# Patient Record
Sex: MALE | Race: WHITE | Employment: OTHER | URBAN - METROPOLITAN AREA
[De-identification: names, ages, dates, MRNs, and addresses within clinical notes are randomized per-mention and may not be internally consistent; named-entity substitution may affect disease eponyms.]

---

## 2017-02-10 ENCOUNTER — OFFICE VISIT (OUTPATIENT)
Dept: INTERNAL MEDICINE | Age: 82
End: 2017-02-10

## 2017-02-10 VITALS
WEIGHT: 125 LBS | DIASTOLIC BLOOD PRESSURE: 80 MMHG | SYSTOLIC BLOOD PRESSURE: 135 MMHG | HEIGHT: 73 IN | BODY MASS INDEX: 16.57 KG/M2

## 2017-02-10 DIAGNOSIS — R53.1 WEAKNESS GENERALIZED: ICD-10-CM
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
## 2017-02-10 DIAGNOSIS — S46.101A INJURY OF TENDON OF LONG HEAD OF RIGHT BICEPS, INITIAL ENCOUNTER: ICD-10-CM

## 2017-02-10 DIAGNOSIS — R07.81 RIB PAIN ON RIGHT SIDE: ICD-10-CM
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
## 2017-02-10 DIAGNOSIS — R42 DIZZINESS: ICD-10-CM

## 2017-02-10 DIAGNOSIS — R42 DIZZINESS: Primary | ICD-10-CM

## 2017-02-10 LAB
A/G RATIO: 1.4 (ref 1.1–2.2)
ALBUMIN SERPL-MCNC: 4 G/DL (ref 3.4–5)
ALP BLD-CCNC: 88 U/L (ref 40–129)
ALT SERPL-CCNC: 17 U/L (ref 10–40)
ANION GAP SERPL CALCULATED.3IONS-SCNC: 14 MMOL/L (ref 3–16)
AST SERPL-CCNC: 25 U/L (ref 15–37)
BASOPHILS ABSOLUTE: 0 K/UL (ref 0–0.2)
BASOPHILS RELATIVE PERCENT: 0.7 %
BILIRUB SERPL-MCNC: 0.5 MG/DL (ref 0–1)
BUN BLDV-MCNC: 14 MG/DL (ref 7–20)
CALCIUM SERPL-MCNC: 9.2 MG/DL (ref 8.3–10.6)
CHLORIDE BLD-SCNC: 93 MMOL/L (ref 99–110)
CO2: 28 MMOL/L (ref 21–32)
CREAT SERPL-MCNC: <0.5 MG/DL (ref 0.8–1.3)
EOSINOPHILS ABSOLUTE: 0.2 K/UL (ref 0–0.6)
EOSINOPHILS RELATIVE PERCENT: 2.8 %
GFR AFRICAN AMERICAN: >60
GFR NON-AFRICAN AMERICAN: >60
GLOBULIN: 2.8 G/DL
GLUCOSE BLD-MCNC: 71 MG/DL (ref 70–99)
HCT VFR BLD CALC: 42.6 % (ref 40.5–52.5)
HEMOGLOBIN: 14.1 G/DL (ref 13.5–17.5)
LYMPHOCYTES ABSOLUTE: 1.1 K/UL (ref 1–5.1)
LYMPHOCYTES RELATIVE PERCENT: 18.4 %
MCH RBC QN AUTO: 30 PG (ref 26–34)
MCHC RBC AUTO-ENTMCNC: 33.1 G/DL (ref 31–36)
MCV RBC AUTO: 90.5 FL (ref 80–100)
MONOCYTES ABSOLUTE: 0.8 K/UL (ref 0–1.3)
MONOCYTES RELATIVE PERCENT: 14 %
NEUTROPHILS ABSOLUTE: 3.8 K/UL (ref 1.7–7.7)
NEUTROPHILS RELATIVE PERCENT: 64.1 %
PDW BLD-RTO: 13 % (ref 12.4–15.4)
PLATELET # BLD: 227 K/UL (ref 135–450)
PMV BLD AUTO: 8.4 FL (ref 5–10.5)
POTASSIUM SERPL-SCNC: 4.6 MMOL/L (ref 3.5–5.1)
RBC # BLD: 4.7 M/UL (ref 4.2–5.9)
SODIUM BLD-SCNC: 135 MMOL/L (ref 136–145)
TOTAL PROTEIN: 6.8 G/DL (ref 6.4–8.2)
WBC # BLD: 5.9 K/UL (ref 4–11)

## 2017-02-10 PROCEDURE — 99214 OFFICE O/P EST MOD 30 MIN: CPT | Performed by: INTERNAL MEDICINE

## 2017-02-10 PROCEDURE — G8428 CUR MEDS NOT DOCUMENT: HCPCS | Performed by: INTERNAL MEDICINE

## 2017-02-10 PROCEDURE — 1036F TOBACCO NON-USER: CPT | Performed by: INTERNAL MEDICINE

## 2017-02-10 PROCEDURE — 93000 ELECTROCARDIOGRAM COMPLETE: CPT | Performed by: INTERNAL MEDICINE

## 2017-02-10 PROCEDURE — 1123F ACP DISCUSS/DSCN MKR DOCD: CPT | Performed by: INTERNAL MEDICINE

## 2017-02-10 PROCEDURE — G8484 FLU IMMUNIZE NO ADMIN: HCPCS | Performed by: INTERNAL MEDICINE

## 2017-02-10 PROCEDURE — 4040F PNEUMOC VAC/ADMIN/RCVD: CPT | Performed by: INTERNAL MEDICINE

## 2017-02-10 PROCEDURE — G8419 CALC BMI OUT NRM PARAM NOF/U: HCPCS | Performed by: INTERNAL MEDICINE

## 2017-02-11 ASSESSMENT — ENCOUNTER SYMPTOMS
VOMITING: 0
SHORTNESS OF BREATH: 0
ABDOMINAL PAIN: 0
SINUS PRESSURE: 0
CHEST TIGHTNESS: 0

## 2017-04-05 ENCOUNTER — OFFICE VISIT (OUTPATIENT)
Dept: INTERNAL MEDICINE | Age: 82
End: 2017-04-05

## 2017-04-05 VITALS — BODY MASS INDEX: 16.04 KG/M2 | WEIGHT: 121 LBS | HEIGHT: 73 IN

## 2017-04-05 DIAGNOSIS — H61.22 EXCESSIVE EAR WAX, LEFT: Primary | ICD-10-CM

## 2017-04-05 PROCEDURE — 1036F TOBACCO NON-USER: CPT | Performed by: INTERNAL MEDICINE

## 2017-04-05 PROCEDURE — G8428 CUR MEDS NOT DOCUMENT: HCPCS | Performed by: INTERNAL MEDICINE

## 2017-04-05 PROCEDURE — 1123F ACP DISCUSS/DSCN MKR DOCD: CPT | Performed by: INTERNAL MEDICINE

## 2017-04-05 PROCEDURE — G8419 CALC BMI OUT NRM PARAM NOF/U: HCPCS | Performed by: INTERNAL MEDICINE

## 2017-04-05 PROCEDURE — 99213 OFFICE O/P EST LOW 20 MIN: CPT | Performed by: INTERNAL MEDICINE

## 2017-04-05 PROCEDURE — 4040F PNEUMOC VAC/ADMIN/RCVD: CPT | Performed by: INTERNAL MEDICINE

## 2017-04-05 ASSESSMENT — ENCOUNTER SYMPTOMS
ABDOMINAL PAIN: 0
SINUS PRESSURE: 0
VOMITING: 0
SHORTNESS OF BREATH: 0
CHEST TIGHTNESS: 0

## 2017-11-14 PROBLEM — E86.0 DEHYDRATION: Status: ACTIVE | Noted: 2017-11-14

## 2017-11-14 PROBLEM — J06.9 UPPER RESPIRATORY TRACT INFECTION: Status: ACTIVE | Noted: 2017-11-14

## 2017-11-15 PROBLEM — J18.9 SEPSIS DUE TO PNEUMONIA (HCC): Status: ACTIVE | Noted: 2017-11-15

## 2017-11-15 PROBLEM — A41.9 SEPSIS (HCC): Status: ACTIVE | Noted: 2017-11-15

## 2017-11-15 PROBLEM — A41.9 SEPSIS DUE TO PNEUMONIA (HCC): Status: ACTIVE | Noted: 2017-11-15

## 2017-11-27 ENCOUNTER — OFFICE VISIT (OUTPATIENT)
Dept: INTERNAL MEDICINE | Age: 82
End: 2017-11-27

## 2017-11-27 VITALS
HEIGHT: 74 IN | WEIGHT: 118 LBS | BODY MASS INDEX: 15.14 KG/M2 | DIASTOLIC BLOOD PRESSURE: 90 MMHG | SYSTOLIC BLOOD PRESSURE: 142 MMHG

## 2017-11-27 DIAGNOSIS — R53.1 WEAKNESS GENERALIZED: ICD-10-CM

## 2017-11-27 DIAGNOSIS — J06.9 UPPER RESPIRATORY TRACT INFECTION, UNSPECIFIED TYPE: ICD-10-CM

## 2017-11-27 DIAGNOSIS — J18.9 PNEUMONIA OF RIGHT LOWER LOBE DUE TO INFECTIOUS ORGANISM: ICD-10-CM

## 2017-11-27 DIAGNOSIS — A41.9 SEPSIS, DUE TO UNSPECIFIED ORGANISM: ICD-10-CM

## 2017-11-27 DIAGNOSIS — R42 DIZZINESS: Primary | ICD-10-CM

## 2017-11-27 PROCEDURE — 1036F TOBACCO NON-USER: CPT | Performed by: INTERNAL MEDICINE

## 2017-11-27 PROCEDURE — 1123F ACP DISCUSS/DSCN MKR DOCD: CPT | Performed by: INTERNAL MEDICINE

## 2017-11-27 PROCEDURE — 99214 OFFICE O/P EST MOD 30 MIN: CPT | Performed by: INTERNAL MEDICINE

## 2017-11-27 PROCEDURE — G8427 DOCREV CUR MEDS BY ELIG CLIN: HCPCS | Performed by: INTERNAL MEDICINE

## 2017-11-27 PROCEDURE — G8419 CALC BMI OUT NRM PARAM NOF/U: HCPCS | Performed by: INTERNAL MEDICINE

## 2017-11-27 PROCEDURE — G8484 FLU IMMUNIZE NO ADMIN: HCPCS | Performed by: INTERNAL MEDICINE

## 2017-11-27 PROCEDURE — 1111F DSCHRG MED/CURRENT MED MERGE: CPT | Performed by: INTERNAL MEDICINE

## 2017-11-27 PROCEDURE — 4040F PNEUMOC VAC/ADMIN/RCVD: CPT | Performed by: INTERNAL MEDICINE

## 2017-11-27 ASSESSMENT — PATIENT HEALTH QUESTIONNAIRE - PHQ9
SUM OF ALL RESPONSES TO PHQ9 QUESTIONS 1 & 2: 0
2. FEELING DOWN, DEPRESSED OR HOPELESS: 0
SUM OF ALL RESPONSES TO PHQ QUESTIONS 1-9: 0
1. LITTLE INTEREST OR PLEASURE IN DOING THINGS: 0

## 2017-11-27 NOTE — PROGRESS NOTES
Outpatient Follow Up Note    Subjective:   CHIEF COMPLAINT / HPI:       Mark Burton  presents today for follow up from hospital after being admitted and treated for an acute respiratory illness of pneumonia. He was admitted on 11/15/17 with generalized weakness,volume depletion, respiratory infection resulting in pneumonia. He developed a picture of sepsis and was treated aggressively with antibiotics. He did respond and was able to be discharged home and now following up at this office. He feels much improved. He is off antibiotics       Past Medical History:    No past medical history on file. Social History:    History   Smoking Status    Never Smoker   Smokeless Tobacco    Never Used         No family history on file. Current Medications:  Current Outpatient Prescriptions on File Prior to Visit   Medication Sig Dispense Refill    tamsulosin (FLOMAX) 0.4 MG capsule Take 1 capsule by mouth daily 30 capsule 3    cefUROXime (CEFTIN) 500 MG tablet Take 1 tablet by mouth 2 times daily for 10 days 20 tablet 0    Multiple Vitamin (MULTIVITAMIN PO) Take  by mouth. No current facility-administered medications on file prior to visit. No Known Allergies    REVIEW OF SYSTEMS:      CONSTITUTIONAL: No major weight  Loss but has fatigue, weakness but resolution of his fever  HEENT: No new vision difficulties or ringing in the ears. No congestion or sore throat. RESPIRATORY: No new SOB, PND, orthopnea or cough. CARDIOVASCULAR: No chest pain or palpitations. GI: No nausea, vomiting, diarrhea, constipation, abdominal pain or changes in bowel habits. : Urinary frequency and urgency No incontinence hematuria or dysuria. SKIN: No cyanosis or skin lesions. MUSCULOSKELETAL: New muscle and joint pain. NEUROLOGICAL: No syncope or TIA-like symptoms. No change in sensation or strength.   PSYCHIATRIC: No anxiety,or depression    Objective:   PHYSICAL EXAM:        VITALS:  BP (!) 142/90   Ht 6' 1.5\" (1.867

## 2018-01-19 ENCOUNTER — PATIENT MESSAGE (OUTPATIENT)
Dept: INTERNAL MEDICINE | Age: 83
End: 2018-01-19

## 2018-02-01 ENCOUNTER — HOSPITAL ENCOUNTER (OUTPATIENT)
Dept: OTHER | Age: 83
Discharge: HOME | End: 2018-02-28
Attending: HOSPITALIST | Admitting: HOSPITALIST

## 2018-03-01 ENCOUNTER — HOSPITAL ENCOUNTER (OUTPATIENT)
Dept: OTHER | Age: 83
Discharge: HOME | End: 2018-03-31
Attending: HOSPITALIST | Admitting: HOSPITALIST

## 2018-04-01 ENCOUNTER — HOSPITAL ENCOUNTER (OUTPATIENT)
Dept: OTHER | Age: 83
Discharge: HOME | End: 2018-04-30
Attending: HOSPITALIST | Admitting: HOSPITALIST

## 2018-04-12 PROBLEM — E86.0 DEHYDRATION: Status: RESOLVED | Noted: 2017-11-14 | Resolved: 2018-04-12

## 2018-05-01 ENCOUNTER — HOSPITAL ENCOUNTER (OUTPATIENT)
Dept: OTHER | Age: 83
Discharge: HOME | End: 2018-05-31
Attending: HOSPITALIST | Admitting: HOSPITALIST

## 2018-06-01 ENCOUNTER — HOSPITAL ENCOUNTER (OUTPATIENT)
Dept: OTHER | Age: 83
Discharge: HOME | End: 2018-06-30
Attending: HOSPITALIST | Admitting: HOSPITALIST

## 2018-07-01 ENCOUNTER — HOSPITAL ENCOUNTER (OUTPATIENT)
Dept: OTHER | Age: 83
Discharge: HOME | End: 2018-07-01
Attending: HOSPITALIST | Admitting: HOSPITALIST

## 2019-03-22 ENCOUNTER — OFFICE VISIT (OUTPATIENT)
Dept: INTERNAL MEDICINE CLINIC | Age: 84
End: 2019-03-22
Payer: MEDICARE

## 2019-03-22 VITALS
DIASTOLIC BLOOD PRESSURE: 82 MMHG | BODY MASS INDEX: 16.55 KG/M2 | SYSTOLIC BLOOD PRESSURE: 130 MMHG | WEIGHT: 129 LBS | HEIGHT: 74 IN

## 2019-03-22 DIAGNOSIS — N40.1 BENIGN PROSTATIC HYPERPLASIA WITH URINARY HESITANCY: ICD-10-CM

## 2019-03-22 DIAGNOSIS — Z23 NEED FOR PNEUMOCOCCAL VACCINE: ICD-10-CM

## 2019-03-22 DIAGNOSIS — R39.11 BENIGN PROSTATIC HYPERPLASIA WITH URINARY HESITANCY: ICD-10-CM

## 2019-03-22 DIAGNOSIS — R63.6 UNDERWEIGHT: ICD-10-CM

## 2019-03-22 DIAGNOSIS — Z00.00 WELL ADULT EXAM: Primary | ICD-10-CM

## 2019-03-22 PROCEDURE — G0009 ADMIN PNEUMOCOCCAL VACCINE: HCPCS | Performed by: HOSPITALIST

## 2019-03-22 PROCEDURE — 99214 OFFICE O/P EST MOD 30 MIN: CPT | Performed by: HOSPITALIST

## 2019-03-22 PROCEDURE — G8484 FLU IMMUNIZE NO ADMIN: HCPCS | Performed by: HOSPITALIST

## 2019-03-22 PROCEDURE — 1101F PT FALLS ASSESS-DOCD LE1/YR: CPT | Performed by: HOSPITALIST

## 2019-03-22 PROCEDURE — G8419 CALC BMI OUT NRM PARAM NOF/U: HCPCS | Performed by: HOSPITALIST

## 2019-03-22 PROCEDURE — G8427 DOCREV CUR MEDS BY ELIG CLIN: HCPCS | Performed by: HOSPITALIST

## 2019-03-22 PROCEDURE — 1036F TOBACCO NON-USER: CPT | Performed by: HOSPITALIST

## 2019-03-22 PROCEDURE — 90732 PPSV23 VACC 2 YRS+ SUBQ/IM: CPT | Performed by: HOSPITALIST

## 2019-03-22 PROCEDURE — 4040F PNEUMOC VAC/ADMIN/RCVD: CPT | Performed by: HOSPITALIST

## 2019-03-22 PROCEDURE — 1123F ACP DISCUSS/DSCN MKR DOCD: CPT | Performed by: HOSPITALIST

## 2019-03-22 ASSESSMENT — PATIENT HEALTH QUESTIONNAIRE - PHQ9
2. FEELING DOWN, DEPRESSED OR HOPELESS: 0
SUM OF ALL RESPONSES TO PHQ9 QUESTIONS 1 & 2: 0
SUM OF ALL RESPONSES TO PHQ QUESTIONS 1-9: 0
SUM OF ALL RESPONSES TO PHQ QUESTIONS 1-9: 0
1. LITTLE INTEREST OR PLEASURE IN DOING THINGS: 0

## 2019-03-22 ASSESSMENT — ENCOUNTER SYMPTOMS
WHEEZING: 0
CONSTIPATION: 0
RECTAL PAIN: 0
SHORTNESS OF BREATH: 0
ABDOMINAL PAIN: 0
CHEST TIGHTNESS: 0
BLOOD IN STOOL: 0
NAUSEA: 0
VOMITING: 0

## 2019-03-23 ENCOUNTER — PATIENT MESSAGE (OUTPATIENT)
Dept: INTERNAL MEDICINE CLINIC | Age: 84
End: 2019-03-23

## 2019-03-25 DIAGNOSIS — Z00.00 WELL ADULT EXAM: ICD-10-CM

## 2019-03-25 LAB
A/G RATIO: 1.3 (ref 1.1–2.2)
ALBUMIN SERPL-MCNC: 3.9 G/DL (ref 3.4–5)
ALP BLD-CCNC: 75 U/L (ref 40–129)
ALT SERPL-CCNC: 18 U/L (ref 10–40)
ANION GAP SERPL CALCULATED.3IONS-SCNC: 11 MMOL/L (ref 3–16)
AST SERPL-CCNC: 29 U/L (ref 15–37)
BASOPHILS ABSOLUTE: 0.1 K/UL (ref 0–0.2)
BASOPHILS RELATIVE PERCENT: 1.3 %
BILIRUB SERPL-MCNC: 0.7 MG/DL (ref 0–1)
BUN BLDV-MCNC: 15 MG/DL (ref 7–20)
CALCIUM SERPL-MCNC: 9.2 MG/DL (ref 8.3–10.6)
CHLORIDE BLD-SCNC: 102 MMOL/L (ref 99–110)
CHOLESTEROL, TOTAL: 167 MG/DL (ref 0–199)
CO2: 28 MMOL/L (ref 21–32)
CREAT SERPL-MCNC: 0.6 MG/DL (ref 0.8–1.3)
EOSINOPHILS ABSOLUTE: 0.2 K/UL (ref 0–0.6)
EOSINOPHILS RELATIVE PERCENT: 3.4 %
GFR AFRICAN AMERICAN: >60
GFR NON-AFRICAN AMERICAN: >60
GLOBULIN: 2.9 G/DL
GLUCOSE BLD-MCNC: 91 MG/DL (ref 70–99)
HCT VFR BLD CALC: 43.4 % (ref 40.5–52.5)
HDLC SERPL-MCNC: 52 MG/DL (ref 40–60)
HEMOGLOBIN: 14.3 G/DL (ref 13.5–17.5)
LDL CHOLESTEROL CALCULATED: 103 MG/DL
LYMPHOCYTES ABSOLUTE: 1.3 K/UL (ref 1–5.1)
LYMPHOCYTES RELATIVE PERCENT: 22.1 %
MCH RBC QN AUTO: 29.9 PG (ref 26–34)
MCHC RBC AUTO-ENTMCNC: 32.9 G/DL (ref 31–36)
MCV RBC AUTO: 90.9 FL (ref 80–100)
MONOCYTES ABSOLUTE: 0.8 K/UL (ref 0–1.3)
MONOCYTES RELATIVE PERCENT: 13.2 %
NEUTROPHILS ABSOLUTE: 3.4 K/UL (ref 1.7–7.7)
NEUTROPHILS RELATIVE PERCENT: 60 %
PDW BLD-RTO: 13.5 % (ref 12.4–15.4)
PLATELET # BLD: 231 K/UL (ref 135–450)
PMV BLD AUTO: 9 FL (ref 5–10.5)
POTASSIUM SERPL-SCNC: 4.6 MMOL/L (ref 3.5–5.1)
RBC # BLD: 4.78 M/UL (ref 4.2–5.9)
SODIUM BLD-SCNC: 141 MMOL/L (ref 136–145)
TOTAL PROTEIN: 6.8 G/DL (ref 6.4–8.2)
TRIGL SERPL-MCNC: 58 MG/DL (ref 0–150)
TSH SERPL DL<=0.05 MIU/L-ACNC: 3 UIU/ML (ref 0.27–4.2)
VLDLC SERPL CALC-MCNC: 12 MG/DL
WBC # BLD: 5.7 K/UL (ref 4–11)

## 2019-04-01 ENCOUNTER — TELEPHONE (OUTPATIENT)
Dept: INTERNAL MEDICINE CLINIC | Age: 84
End: 2019-04-01

## 2019-04-02 ENCOUNTER — OFFICE VISIT (OUTPATIENT)
Dept: INTERNAL MEDICINE CLINIC | Age: 84
End: 2019-04-02
Payer: MEDICARE

## 2019-04-02 VITALS
TEMPERATURE: 98.4 F | DIASTOLIC BLOOD PRESSURE: 66 MMHG | WEIGHT: 129 LBS | BODY MASS INDEX: 16.55 KG/M2 | HEIGHT: 74 IN | SYSTOLIC BLOOD PRESSURE: 110 MMHG

## 2019-04-02 DIAGNOSIS — I49.9 IRREGULAR HEART RATE: ICD-10-CM

## 2019-04-02 DIAGNOSIS — J06.9 ACUTE URI: Primary | ICD-10-CM

## 2019-04-02 PROCEDURE — G8427 DOCREV CUR MEDS BY ELIG CLIN: HCPCS | Performed by: HOSPITALIST

## 2019-04-02 PROCEDURE — 93000 ELECTROCARDIOGRAM COMPLETE: CPT | Performed by: HOSPITALIST

## 2019-04-02 PROCEDURE — 1123F ACP DISCUSS/DSCN MKR DOCD: CPT | Performed by: HOSPITALIST

## 2019-04-02 PROCEDURE — 4040F PNEUMOC VAC/ADMIN/RCVD: CPT | Performed by: HOSPITALIST

## 2019-04-02 PROCEDURE — G8419 CALC BMI OUT NRM PARAM NOF/U: HCPCS | Performed by: HOSPITALIST

## 2019-04-02 PROCEDURE — 1036F TOBACCO NON-USER: CPT | Performed by: HOSPITALIST

## 2019-04-02 PROCEDURE — 99213 OFFICE O/P EST LOW 20 MIN: CPT | Performed by: HOSPITALIST

## 2019-04-02 ASSESSMENT — PATIENT HEALTH QUESTIONNAIRE - PHQ9
2. FEELING DOWN, DEPRESSED OR HOPELESS: 0
SUM OF ALL RESPONSES TO PHQ9 QUESTIONS 1 & 2: 0
1. LITTLE INTEREST OR PLEASURE IN DOING THINGS: 0
SUM OF ALL RESPONSES TO PHQ QUESTIONS 1-9: 0
SUM OF ALL RESPONSES TO PHQ QUESTIONS 1-9: 0

## 2019-04-02 NOTE — PROGRESS NOTES
Follow Up Visit Established Patient Visit    Patient:  Joe Hook                                               : 1934  Age: 80 y.o. MRN: [id number]  Date : 2019    Mark Sanderson is a 80 y.o. male who presents for :  Evaluation of sinus congestion, post-nansal drip, and sneezing in the last 3-4 days. He also reports mild cough. Started to take Mucinex twice a day couple of days ago. He doesn't report fever nor chills. No facial pain. Chief Complaint   Patient presents with    Cough     and sneezing       No past medical history on file. History reviewed. No pertinent surgical history. Current Outpatient Medications   Medication Sig Dispense Refill    tamsulosin (FLOMAX) 0.4 MG capsule Take 1 capsule by mouth daily 30 capsule 3    Multiple Vitamin (MULTIVITAMIN PO) Take  by mouth. No current facility-administered medications for this visit. /66   Temp 98.4 °F (36.9 °C)   Ht 6' 1.5\" (1.867 m)   Wt 129 lb (58.5 kg)   BMI 16.79 kg/m²     Physical Exam   Constitutional: He is oriented to person, place, and time. Thin looking  gentleman In no acute distress. Cooperative with examination. HENT:   Head: Normocephalic and atraumatic. Right Ear: External ear normal.   Left Ear: External ear normal.   Mouth/Throat: Oropharynx is clear and moist.   Nasal mucosa is moderately swollen and mildly inflamed. + small amount of greenish discharge in the R inferior turbinate.  + L sided septal deviation. Eyes: Pupils are equal, round, and reactive to light. Conjunctivae and EOM are normal. No scleral icterus. Neck: Normal range of motion. Neck supple. No JVD present. No thyromegaly present. Cardiovascular: Normal heart sounds and intact distal pulses. Exam reveals no gallop. Irregular heart rate   Pulmonary/Chest: Effort normal and breath sounds normal. He has no wheezes. He has no rales. Abdominal: Soft.  Bowel sounds are normal. He exhibits no distension and no mass. There is no tenderness. There is no guarding. Lymphadenopathy:     He has no cervical adenopathy. Neurological: He is alert and oriented to person, place, and time. No cranial nerve deficit. Skin: Skin is warm. No rash noted. No erythema. Psychiatric: He has a normal mood and affect. Vitals reviewed. 12 lead ECG- sinus rhythm; PVCs, no acute ischemic changes  Assessment/ plan:     Mark was seen today for cough.     Diagnoses and all orders for this visit:    Acute URI       -     Appears to be viral in nature; will proceed with supportive care       -     Recommended to use Flonase 2 sprays in each nostril daily as needed; stay well hydrated; Mucinex 600 mg PO BID prn for cough    Irregular heart rate  -     EKG 12 lead  -     Will monitor      Follow up as needed        Nirali Ware MD

## 2019-07-10 PROCEDURE — 9900000038 HC CARDIAC REHAB PHASE 3 - MONTHLY

## 2019-07-29 ENCOUNTER — HOSPITAL ENCOUNTER (OUTPATIENT)
Dept: CARDIAC REHAB | Age: 84
Discharge: HOME | End: 2019-07-29
Payer: MEDICARE

## 2019-08-09 PROCEDURE — 9900000038 HC CARDIAC REHAB PHASE 3 - MONTHLY

## 2019-08-26 ENCOUNTER — HOSPITAL ENCOUNTER (OUTPATIENT)
Dept: CARDIAC REHAB | Age: 84
Discharge: HOME | End: 2019-08-26
Payer: MEDICARE

## 2019-09-09 PROCEDURE — 9900000038 HC CARDIAC REHAB PHASE 3 - MONTHLY

## 2019-09-25 ENCOUNTER — OFFICE VISIT (OUTPATIENT)
Dept: INTERNAL MEDICINE CLINIC | Age: 84
End: 2019-09-25
Payer: MEDICARE

## 2019-09-25 VITALS — BODY MASS INDEX: 16.01 KG/M2 | WEIGHT: 123 LBS | SYSTOLIC BLOOD PRESSURE: 142 MMHG | DIASTOLIC BLOOD PRESSURE: 78 MMHG

## 2019-09-25 DIAGNOSIS — R39.11 BENIGN PROSTATIC HYPERPLASIA WITH URINARY HESITANCY: Primary | ICD-10-CM

## 2019-09-25 DIAGNOSIS — N40.1 BENIGN PROSTATIC HYPERPLASIA WITH URINARY HESITANCY: Primary | ICD-10-CM

## 2019-09-25 DIAGNOSIS — Z13.220 SCREENING CHOLESTEROL LEVEL: ICD-10-CM

## 2019-09-25 DIAGNOSIS — R03.0 ELEVATED BP WITHOUT DIAGNOSIS OF HYPERTENSION: ICD-10-CM

## 2019-09-25 DIAGNOSIS — Z23 NEED FOR INFLUENZA VACCINATION: ICD-10-CM

## 2019-09-25 PROCEDURE — 1123F ACP DISCUSS/DSCN MKR DOCD: CPT | Performed by: HOSPITALIST

## 2019-09-25 PROCEDURE — G8419 CALC BMI OUT NRM PARAM NOF/U: HCPCS | Performed by: HOSPITALIST

## 2019-09-25 PROCEDURE — 1036F TOBACCO NON-USER: CPT | Performed by: HOSPITALIST

## 2019-09-25 PROCEDURE — 99214 OFFICE O/P EST MOD 30 MIN: CPT | Performed by: HOSPITALIST

## 2019-09-25 PROCEDURE — 4040F PNEUMOC VAC/ADMIN/RCVD: CPT | Performed by: HOSPITALIST

## 2019-09-25 PROCEDURE — G0008 ADMIN INFLUENZA VIRUS VAC: HCPCS | Performed by: HOSPITALIST

## 2019-09-25 PROCEDURE — 90653 IIV ADJUVANT VACCINE IM: CPT | Performed by: HOSPITALIST

## 2019-09-25 PROCEDURE — G8427 DOCREV CUR MEDS BY ELIG CLIN: HCPCS | Performed by: HOSPITALIST

## 2019-09-25 ASSESSMENT — PATIENT HEALTH QUESTIONNAIRE - PHQ9
1. LITTLE INTEREST OR PLEASURE IN DOING THINGS: 0
SUM OF ALL RESPONSES TO PHQ9 QUESTIONS 1 & 2: 0
2. FEELING DOWN, DEPRESSED OR HOPELESS: 0
SUM OF ALL RESPONSES TO PHQ QUESTIONS 1-9: 0
SUM OF ALL RESPONSES TO PHQ QUESTIONS 1-9: 0

## 2019-09-25 NOTE — PROGRESS NOTES
Vaccine Information Sheet, \"Influenza - Inactivated\"  given to Mark Burton, or parent/legal guardian of  Mark Burton and verbalized understanding. Patient responses:    Have you ever had a reaction to a flu vaccine? No  Do you have any current illness? No  Have you ever had Guillian Jessie Syndrome? No  Do you have a serious allergy to any of the follow: Neomycin, Polymyxin, Thimerosal, eggs or egg products? No    Flu vaccine given per order. Please see immunization tab. Risks and benefits explained. Current VIS given.       Immunizations Administered     Name Date Dose Route    Influenza, Triv, inactivated, subunit, adjuvanted, IM (Fluad 65 yrs and older) 9/25/2019 0.5 mL Intramuscular    Site: Deltoid- Left    Lot: [id number]    [address]: 95363-406-26

## 2019-09-25 NOTE — PROGRESS NOTES
Follow Up Visit Established Patient Visit    Patient:  Kadeem Augustine                                               : 1934  Age: 80 y.o. MRN: [id number]  Date : 2019    Mark Gil is a 80 y.o. male who presents for :  Follow up appointment. Chief Complaint   Patient presents with    6 Month Follow-Up     Doesn't report any urinary hesitancy/ urgency. Takes Flomax regularly. No recent falls; doesn't feel depressed. No obvious hearing loss. Exercises twice/ week for 1 hour at cardiac rehab program of [facility] [employer]  No CP/SOB; no nausea/vomiting/ diarrhea. Past Medical History:   Diagnosis Date    Bilateral pneumonia 2017    BPH (benign prostatic hyperplasia)        No past surgical history on file. Current Outpatient Medications   Medication Sig Dispense Refill    tamsulosin (FLOMAX) 0.4 MG capsule Take 1 capsule by mouth daily 30 capsule 3    Multiple Vitamin (MULTIVITAMIN PO) Take  by mouth. No current facility-administered medications for this visit. BP (!) 142/78   Wt 123 lb (55.8 kg)   BMI 16.01 kg/m²      Physical Exam   Constitutional: He is oriented to person, place, and time. Thin looking  male. Pleasant and cooperative with examination. In no acute distress. HENT:   Head: Normocephalic and atraumatic. Right Ear: External ear normal.   Left Ear: External ear normal.   Mouth/Throat: Oropharynx is clear and moist. No oropharyngeal exudate. Nasal mucosa is mildly swollen and inflamed   Eyes: Pupils are equal, round, and reactive to light. Conjunctivae and EOM are normal. No scleral icterus. Neck: Normal range of motion. Neck supple. No JVD present. No thyromegaly present. Cardiovascular: Normal rate, regular rhythm, normal heart sounds and intact distal pulses. No murmur heard. Pulmonary/Chest: Effort normal and breath sounds normal. He has no wheezes. He has no rales. He exhibits no tenderness. Abdominal: Soft.  Bowel sounds are

## 2019-09-30 ENCOUNTER — HOSPITAL ENCOUNTER (OUTPATIENT)
Dept: CARDIAC REHAB | Age: 84
Discharge: HOME | End: 2019-09-30
Payer: MEDICARE

## 2019-10-08 DIAGNOSIS — R03.0 ELEVATED BP WITHOUT DIAGNOSIS OF HYPERTENSION: ICD-10-CM

## 2019-10-08 DIAGNOSIS — Z13.220 SCREENING CHOLESTEROL LEVEL: ICD-10-CM

## 2019-10-08 LAB
A/G RATIO: 2.3 (ref 1.1–2.2)
ALBUMIN SERPL-MCNC: 4.6 G/DL (ref 3.4–5)
ALP BLD-CCNC: 72 U/L (ref 40–129)
ALT SERPL-CCNC: 15 U/L (ref 10–40)
ANION GAP SERPL CALCULATED.3IONS-SCNC: 12 MMOL/L (ref 3–16)
AST SERPL-CCNC: 27 U/L (ref 15–37)
BASOPHILS ABSOLUTE: 0.1 K/UL (ref 0–0.2)
BASOPHILS RELATIVE PERCENT: 1.1 %
BILIRUB SERPL-MCNC: 0.7 MG/DL (ref 0–1)
BUN BLDV-MCNC: 15 MG/DL (ref 7–20)
CALCIUM SERPL-MCNC: 9.3 MG/DL (ref 8.3–10.6)
CHLORIDE BLD-SCNC: 99 MMOL/L (ref 99–110)
CHOLESTEROL, TOTAL: 147 MG/DL (ref 0–199)
CO2: 28 MMOL/L (ref 21–32)
CREAT SERPL-MCNC: 0.6 MG/DL (ref 0.8–1.3)
EOSINOPHILS ABSOLUTE: 0.1 K/UL (ref 0–0.6)
EOSINOPHILS RELATIVE PERCENT: 2.8 %
GFR AFRICAN AMERICAN: >60
GFR NON-AFRICAN AMERICAN: >60
GLOBULIN: 2 G/DL
GLUCOSE BLD-MCNC: 88 MG/DL (ref 70–99)
HCT VFR BLD CALC: 39.7 % (ref 40.5–52.5)
HDLC SERPL-MCNC: 48 MG/DL (ref 40–60)
HEMOGLOBIN: 13.4 G/DL (ref 13.5–17.5)
LDL CHOLESTEROL CALCULATED: 87 MG/DL
LYMPHOCYTES ABSOLUTE: 1.2 K/UL (ref 1–5.1)
LYMPHOCYTES RELATIVE PERCENT: 23.8 %
MCH RBC QN AUTO: 30.3 PG (ref 26–34)
MCHC RBC AUTO-ENTMCNC: 33.7 G/DL (ref 31–36)
MCV RBC AUTO: 90.1 FL (ref 80–100)
MONOCYTES ABSOLUTE: 0.7 K/UL (ref 0–1.3)
MONOCYTES RELATIVE PERCENT: 14.6 %
NEUTROPHILS ABSOLUTE: 2.8 K/UL (ref 1.7–7.7)
NEUTROPHILS RELATIVE PERCENT: 57.7 %
PDW BLD-RTO: 13.4 % (ref 12.4–15.4)
PLATELET # BLD: 207 K/UL (ref 135–450)
PMV BLD AUTO: 8.3 FL (ref 5–10.5)
POTASSIUM SERPL-SCNC: 4.3 MMOL/L (ref 3.5–5.1)
RBC # BLD: 4.4 M/UL (ref 4.2–5.9)
SODIUM BLD-SCNC: 139 MMOL/L (ref 136–145)
TOTAL PROTEIN: 6.6 G/DL (ref 6.4–8.2)
TRIGL SERPL-MCNC: 61 MG/DL (ref 0–150)
VLDLC SERPL CALC-MCNC: 12 MG/DL
WBC # BLD: 4.9 K/UL (ref 4–11)

## 2019-10-11 PROCEDURE — 9900000038 HC CARDIAC REHAB PHASE 3 - MONTHLY

## 2019-10-28 ENCOUNTER — HOSPITAL ENCOUNTER (OUTPATIENT)
Dept: CARDIAC REHAB | Age: 84
Discharge: HOME | End: 2019-10-28
Payer: MEDICARE

## 2019-11-11 PROCEDURE — 9900000038 HC CARDIAC REHAB PHASE 3 - MONTHLY

## 2019-11-25 ENCOUNTER — HOSPITAL ENCOUNTER (OUTPATIENT)
Dept: CARDIAC REHAB | Age: 84
Discharge: HOME | End: 2019-11-25
Payer: MEDICARE

## 2019-12-09 PROCEDURE — 9900000038 HC CARDIAC REHAB PHASE 3 - MONTHLY

## 2019-12-11 ENCOUNTER — OFFICE VISIT (OUTPATIENT)
Dept: INTERNAL MEDICINE CLINIC | Age: 84
End: 2019-12-11
Payer: MEDICARE

## 2019-12-11 VITALS
DIASTOLIC BLOOD PRESSURE: 80 MMHG | BODY MASS INDEX: 16.04 KG/M2 | HEIGHT: 74 IN | WEIGHT: 125 LBS | SYSTOLIC BLOOD PRESSURE: 122 MMHG

## 2019-12-11 DIAGNOSIS — N40.1 BENIGN PROSTATIC HYPERPLASIA WITH URINARY HESITANCY: ICD-10-CM

## 2019-12-11 DIAGNOSIS — R03.0 ELEVATED BP WITHOUT DIAGNOSIS OF HYPERTENSION: ICD-10-CM

## 2019-12-11 DIAGNOSIS — Z00.00 ROUTINE GENERAL MEDICAL EXAMINATION AT A HEALTH CARE FACILITY: Primary | ICD-10-CM

## 2019-12-11 DIAGNOSIS — Z23 NEED FOR SHINGLES VACCINE: ICD-10-CM

## 2019-12-11 DIAGNOSIS — R39.11 BENIGN PROSTATIC HYPERPLASIA WITH URINARY HESITANCY: ICD-10-CM

## 2019-12-11 PROCEDURE — G0439 PPPS, SUBSEQ VISIT: HCPCS | Performed by: HOSPITALIST

## 2019-12-11 PROCEDURE — 4040F PNEUMOC VAC/ADMIN/RCVD: CPT | Performed by: HOSPITALIST

## 2019-12-11 PROCEDURE — G8482 FLU IMMUNIZE ORDER/ADMIN: HCPCS | Performed by: HOSPITALIST

## 2019-12-11 PROCEDURE — 1123F ACP DISCUSS/DSCN MKR DOCD: CPT | Performed by: HOSPITALIST

## 2019-12-11 ASSESSMENT — LIFESTYLE VARIABLES
HOW OFTEN DO YOU HAVE A DRINK CONTAINING ALCOHOL: 2
HOW MANY STANDARD DRINKS CONTAINING ALCOHOL DO YOU HAVE ON A TYPICAL DAY: 0
HOW OFTEN DURING THE LAST YEAR HAVE YOU FAILED TO DO WHAT WAS NORMALLY EXPECTED FROM YOU BECAUSE OF DRINKING: 0
HOW OFTEN DURING THE LAST YEAR HAVE YOU BEEN UNABLE TO REMEMBER WHAT HAPPENED THE NIGHT BEFORE BECAUSE YOU HAD BEEN DRINKING: 0
HAVE YOU OR SOMEONE ELSE BEEN INJURED AS A RESULT OF YOUR DRINKING: 0
AUDIT TOTAL SCORE: 2
HOW OFTEN DURING THE LAST YEAR HAVE YOU NEEDED AN ALCOHOLIC DRINK FIRST THING IN THE MORNING TO GET YOURSELF GOING AFTER A NIGHT OF HEAVY DRINKING: 0
HOW OFTEN DURING THE LAST YEAR HAVE YOU HAD A FEELING OF GUILT OR REMORSE AFTER DRINKING: 0
AUDIT-C TOTAL SCORE: 2
HOW OFTEN DO YOU HAVE SIX OR MORE DRINKS ON ONE OCCASION: 0
HOW OFTEN DURING THE LAST YEAR HAVE YOU FOUND THAT YOU WERE NOT ABLE TO STOP DRINKING ONCE YOU HAD STARTED: 0
HAS A RELATIVE, FRIEND, DOCTOR, OR ANOTHER HEALTH PROFESSIONAL EXPRESSED CONCERN ABOUT YOUR DRINKING OR SUGGESTED YOU CUT DOWN: 0

## 2019-12-11 ASSESSMENT — PATIENT HEALTH QUESTIONNAIRE - PHQ9
SUM OF ALL RESPONSES TO PHQ QUESTIONS 1-9: 0
SUM OF ALL RESPONSES TO PHQ QUESTIONS 1-9: 0

## 2019-12-30 ENCOUNTER — HOSPITAL ENCOUNTER (OUTPATIENT)
Dept: CARDIAC REHAB | Age: 84
Discharge: HOME | End: 2019-12-30
Payer: MEDICARE

## 2020-01-03 ENCOUNTER — TELEPHONE (OUTPATIENT)
Dept: INTERNAL MEDICINE CLINIC | Age: 85
End: 2020-01-03

## 2020-01-03 RX ORDER — LISINOPRIL 5 MG/1
5 TABLET ORAL DAILY
Qty: 30 TABLET | Refills: 3 | Status: SHIPPED | OUTPATIENT
Start: 2020-01-03 | End: 2020-05-05 | Stop reason: SDUPTHER

## 2020-02-03 ENCOUNTER — OFFICE VISIT (OUTPATIENT)
Dept: INTERNAL MEDICINE CLINIC | Age: 85
End: 2020-02-03
Payer: MEDICARE

## 2020-02-03 VITALS — WEIGHT: 125 LBS | BODY MASS INDEX: 16.27 KG/M2 | SYSTOLIC BLOOD PRESSURE: 124 MMHG | DIASTOLIC BLOOD PRESSURE: 84 MMHG

## 2020-02-03 PROCEDURE — 4040F PNEUMOC VAC/ADMIN/RCVD: CPT | Performed by: HOSPITALIST

## 2020-02-03 PROCEDURE — 1123F ACP DISCUSS/DSCN MKR DOCD: CPT | Performed by: HOSPITALIST

## 2020-02-03 PROCEDURE — G8419 CALC BMI OUT NRM PARAM NOF/U: HCPCS | Performed by: HOSPITALIST

## 2020-02-03 PROCEDURE — G8482 FLU IMMUNIZE ORDER/ADMIN: HCPCS | Performed by: HOSPITALIST

## 2020-02-03 PROCEDURE — 1036F TOBACCO NON-USER: CPT | Performed by: HOSPITALIST

## 2020-02-03 PROCEDURE — 99213 OFFICE O/P EST LOW 20 MIN: CPT | Performed by: HOSPITALIST

## 2020-02-03 PROCEDURE — G8510 SCR DEP NEG, NO PLAN REQD: HCPCS | Performed by: HOSPITALIST

## 2020-02-03 PROCEDURE — G8427 DOCREV CUR MEDS BY ELIG CLIN: HCPCS | Performed by: HOSPITALIST

## 2020-02-03 ASSESSMENT — PATIENT HEALTH QUESTIONNAIRE - PHQ9
SUM OF ALL RESPONSES TO PHQ9 QUESTIONS 1 & 2: 0
SUM OF ALL RESPONSES TO PHQ QUESTIONS 1-9: 0
SUM OF ALL RESPONSES TO PHQ QUESTIONS 1-9: 0
2. FEELING DOWN, DEPRESSED OR HOPELESS: 0
1. LITTLE INTEREST OR PLEASURE IN DOING THINGS: 0

## 2020-02-03 NOTE — PROGRESS NOTES
Normal breath sounds. No wheezing or rales. Abdominal:      General: Bowel sounds are normal. There is no distension. Palpations: Abdomen is soft. Tenderness: There is no abdominal tenderness. Musculoskeletal: Normal range of motion. Right lower leg: No edema. Left lower leg: No edema. Skin:     General: Skin is warm and dry. Neurological:      General: No focal deficit present. Mental Status: He is alert and oriented to person, place, and time. Cranial Nerves: No cranial nerve deficit. Psychiatric:         Mood and Affect: Mood normal.         Assessment/ plan:     Mark was seen today for hypertension. Diagnoses and all orders for this visit:    Essential hypertension  -Patient brought in log for BP. Well managed on lisnopril 5mg.  -Continue current dose. -Explained the need to call if orthostatic dizziness, low BP readings or any fluctuations in BP    Benign prostatic hyperplasia with urinary hesitancy  -On flomax. Stable. Improved sym toms. No new changes. Continue current treatment    Preventative care  -Discussed shingles vaccine. Patient will decide at a late time. Return in about 6 months (around 8/3/2020) for HTN, BPH.     Nakita Boateng MD

## 2020-02-25 ENCOUNTER — HOSPITAL ENCOUNTER (OUTPATIENT)
Dept: CARDIAC REHAB | Age: 85
Discharge: HOME | End: 2020-02-25
Payer: MEDICARE

## 2020-02-25 PROCEDURE — 9900000038 HC CARDIAC REHAB PHASE 3 - MONTHLY: Performed by: PHYSICAL THERAPIST

## 2020-05-05 RX ORDER — LISINOPRIL 5 MG/1
5 TABLET ORAL DAILY
Qty: 30 TABLET | Refills: 3 | Status: SHIPPED | OUTPATIENT
Start: 2020-05-05 | End: 2020-08-01

## 2020-08-01 RX ORDER — LISINOPRIL 5 MG/1
TABLET ORAL
Qty: 90 TABLET | Refills: 1 | Status: SHIPPED | OUTPATIENT
Start: 2020-08-01 | End: 2021-02-03

## 2020-08-06 ENCOUNTER — OFFICE VISIT (OUTPATIENT)
Dept: INTERNAL MEDICINE CLINIC | Age: 85
End: 2020-08-06
Payer: MEDICARE

## 2020-08-06 VITALS
HEIGHT: 74 IN | WEIGHT: 112 LBS | SYSTOLIC BLOOD PRESSURE: 128 MMHG | DIASTOLIC BLOOD PRESSURE: 87 MMHG | TEMPERATURE: 97.9 F | BODY MASS INDEX: 14.37 KG/M2

## 2020-08-06 PROCEDURE — 99213 OFFICE O/P EST LOW 20 MIN: CPT | Performed by: HOSPITALIST

## 2020-08-06 PROCEDURE — 1123F ACP DISCUSS/DSCN MKR DOCD: CPT | Performed by: HOSPITALIST

## 2020-08-06 PROCEDURE — 1036F TOBACCO NON-USER: CPT | Performed by: HOSPITALIST

## 2020-08-06 PROCEDURE — 4040F PNEUMOC VAC/ADMIN/RCVD: CPT | Performed by: HOSPITALIST

## 2020-08-06 PROCEDURE — G8427 DOCREV CUR MEDS BY ELIG CLIN: HCPCS | Performed by: HOSPITALIST

## 2020-08-06 PROCEDURE — G8419 CALC BMI OUT NRM PARAM NOF/U: HCPCS | Performed by: HOSPITALIST

## 2020-08-06 NOTE — PROGRESS NOTES
Follow Up Visit Established Patient Visit    Patient:  Cam Dominguez                                               : 1934  Age: 80 y.o. MRN: <[id number]>  Date : 2020    Mark Lui is a 80 y.o. male who presents for :  Follow up    Hypertension - Checks BP at home, runs in the 130s at home. Denies CP, SOB, palpitations, lightheadedness, LE swelling. Denies abdominal pain, N/V.    BPH - denies urinary frequency, urgency, hesitation, weak stream. Reports he has some nocturia but well controlled with flomax    Chief Complaint   Patient presents with    Blood Pressure Check       Past Medical History:   Diagnosis Date    Bilateral pneumonia 2017    BPH (benign prostatic hyperplasia)        No past surgical history on file. Current Outpatient Medications   Medication Sig Dispense Refill    lisinopril (PRINIVIL;ZESTRIL) 5 MG tablet TAKE 1 TABLET BY MOUTH EVERY DAY 90 tablet 1    tamsulosin (FLOMAX) 0.4 MG capsule Take 1 capsule by mouth daily 30 capsule 3    Multiple Vitamin (MULTIVITAMIN PO) Take  by mouth. No current facility-administered medications for this visit. /87   Temp 97.9 °F (36.6 °C)   Ht 6' 1.5\" (1.867 m)   Wt 112 lb (50.8 kg)   BMI 14.58 kg/m²     Physical Exam   Physical Exam  Vitals signs reviewed. Constitutional:       General: He is not in acute distress. Appearance: Normal appearance. He is not ill-appearing. HENT:      Head: Normocephalic and atraumatic. Nose: Nose normal.      Mouth/Throat:      Mouth: Mucous membranes are moist.      Pharynx: Oropharynx is clear. No oropharyngeal exudate or posterior oropharyngeal erythema. Eyes:      Extraocular Movements: Extraocular movements intact. Conjunctiva/sclera: Conjunctivae normal.      Pupils: Pupils are equal, round, and reactive to light. Neck:      Musculoskeletal: Normal range of motion and neck supple. No neck rigidity or muscular tenderness. Vascular: No carotid bruit. Cardiovascular:      Rate and Rhythm: Normal rate and regular rhythm. Pulses: Normal pulses. Heart sounds: Normal heart sounds. No murmur. No friction rub. No gallop. Pulmonary:      Effort: Pulmonary effort is normal. No respiratory distress. Breath sounds: Normal breath sounds. No wheezing, rhonchi or rales. Abdominal:      General: Abdomen is flat. Bowel sounds are normal. There is no distension. Palpations: There is no mass. Tenderness: There is no abdominal tenderness. There is no right CVA tenderness, left CVA tenderness or guarding. Hernia: No hernia is present. Musculoskeletal: Normal range of motion. Right lower leg: No edema. Left lower leg: No edema. Lymphadenopathy:      Cervical: No cervical adenopathy. Skin:     General: Skin is warm. Findings: No erythema or rash. Neurological:      General: No focal deficit present. Mental Status: He is alert and oriented to person, place, and time.    Psychiatric:         Mood and Affect: Mood normal.         Assessment/ plan:     Hypertension  Well controlled  Continue lisinopril at current dose  Will repeat labs - CBC, CMP, lipids    BPH  Stable symptoms  Continue flomax    Follow-up in 6 months    Monica Robles MD

## 2020-08-24 DIAGNOSIS — I10 ESSENTIAL HYPERTENSION: ICD-10-CM

## 2020-08-24 LAB
A/G RATIO: 1.7 (ref 1.1–2.2)
ALBUMIN SERPL-MCNC: 3.9 G/DL (ref 3.4–5)
ALP BLD-CCNC: 64 U/L (ref 40–129)
ALT SERPL-CCNC: 16 U/L (ref 10–40)
ANION GAP SERPL CALCULATED.3IONS-SCNC: 8 MMOL/L (ref 3–16)
AST SERPL-CCNC: 23 U/L (ref 15–37)
BASOPHILS ABSOLUTE: 0 K/UL (ref 0–0.2)
BASOPHILS RELATIVE PERCENT: 0.5 %
BILIRUB SERPL-MCNC: 0.6 MG/DL (ref 0–1)
BUN BLDV-MCNC: 17 MG/DL (ref 7–20)
CALCIUM SERPL-MCNC: 9.2 MG/DL (ref 8.3–10.6)
CHLORIDE BLD-SCNC: 100 MMOL/L (ref 99–110)
CHOLESTEROL, TOTAL: 139 MG/DL (ref 0–199)
CO2: 29 MMOL/L (ref 21–32)
CREAT SERPL-MCNC: 0.6 MG/DL (ref 0.8–1.3)
EOSINOPHILS ABSOLUTE: 0.1 K/UL (ref 0–0.6)
EOSINOPHILS RELATIVE PERCENT: 2.5 %
GFR AFRICAN AMERICAN: >60
GFR NON-AFRICAN AMERICAN: >60
GLOBULIN: 2.3 G/DL
GLUCOSE BLD-MCNC: 77 MG/DL (ref 70–99)
HCT VFR BLD CALC: 37.7 % (ref 40.5–52.5)
HDLC SERPL-MCNC: 50 MG/DL (ref 40–60)
HEMOGLOBIN: 12.8 G/DL (ref 13.5–17.5)
LDL CHOLESTEROL CALCULATED: 80 MG/DL
LYMPHOCYTES ABSOLUTE: 1.6 K/UL (ref 1–5.1)
LYMPHOCYTES RELATIVE PERCENT: 28.3 %
MCH RBC QN AUTO: 30.8 PG (ref 26–34)
MCHC RBC AUTO-ENTMCNC: 34.1 G/DL (ref 31–36)
MCV RBC AUTO: 90.2 FL (ref 80–100)
MONOCYTES ABSOLUTE: 0.8 K/UL (ref 0–1.3)
MONOCYTES RELATIVE PERCENT: 14.2 %
NEUTROPHILS ABSOLUTE: 3 K/UL (ref 1.7–7.7)
NEUTROPHILS RELATIVE PERCENT: 54.5 %
PDW BLD-RTO: 13.2 % (ref 12.4–15.4)
PLATELET # BLD: 204 K/UL (ref 135–450)
PMV BLD AUTO: 8 FL (ref 5–10.5)
POTASSIUM SERPL-SCNC: 4.1 MMOL/L (ref 3.5–5.1)
RBC # BLD: 4.17 M/UL (ref 4.2–5.9)
SODIUM BLD-SCNC: 137 MMOL/L (ref 136–145)
TOTAL PROTEIN: 6.2 G/DL (ref 6.4–8.2)
TRIGL SERPL-MCNC: 43 MG/DL (ref 0–150)
VLDLC SERPL CALC-MCNC: 9 MG/DL
WBC # BLD: 5.6 K/UL (ref 4–11)

## 2020-09-01 RX ORDER — TAMSULOSIN HYDROCHLORIDE 0.4 MG/1
0.4 CAPSULE ORAL DAILY
Qty: 30 CAPSULE | Refills: 3 | Status: SHIPPED | OUTPATIENT
Start: 2020-09-01 | End: 2021-02-02

## 2020-09-01 NOTE — TELEPHONE ENCOUNTER
med refill    tamsulosin (FLOMAX) 0.4 MG capsule     [facility]/pharmacy #[id number]- [place], [place] - [address] - P [phone] - F [phone]

## 2021-02-02 RX ORDER — TAMSULOSIN HYDROCHLORIDE 0.4 MG/1
CAPSULE ORAL
Qty: 90 CAPSULE | Refills: 1 | Status: SHIPPED | OUTPATIENT
Start: 2021-02-02 | End: 2021-08-06

## 2021-02-03 RX ORDER — LISINOPRIL 5 MG/1
TABLET ORAL
Qty: 90 TABLET | Refills: 1 | Status: SHIPPED | OUTPATIENT
Start: 2021-02-03 | End: 2021-07-30

## 2021-02-04 ENCOUNTER — IMMUNIZATION (OUTPATIENT)
Dept: PRIMARY CARE CLINIC | Age: 86
End: 2021-02-04
Payer: MEDICARE

## 2021-02-04 PROCEDURE — 0011A COVID-19, MODERNA VACCINE 100MCG/0.5ML DOSE: CPT | Performed by: FAMILY MEDICINE

## 2021-02-04 PROCEDURE — 91301 COVID-19, MODERNA VACCINE 100MCG/0.5ML DOSE: CPT | Performed by: FAMILY MEDICINE

## 2021-03-04 ENCOUNTER — IMMUNIZATION (OUTPATIENT)
Dept: PRIMARY CARE CLINIC | Age: 86
End: 2021-03-04
Payer: MEDICARE

## 2021-03-04 PROCEDURE — 0012A COVID-19, MODERNA VACCINE 100MCG/0.5ML DOSE: CPT | Performed by: FAMILY MEDICINE

## 2021-03-04 PROCEDURE — 91301 COVID-19, MODERNA VACCINE 100MCG/0.5ML DOSE: CPT | Performed by: FAMILY MEDICINE

## 2021-07-30 RX ORDER — LISINOPRIL 5 MG/1
TABLET ORAL
Qty: 90 TABLET | Refills: 0 | Status: SHIPPED | OUTPATIENT
Start: 2021-07-30 | End: 2021-10-26

## 2021-08-07 RX ORDER — TAMSULOSIN HYDROCHLORIDE 0.4 MG/1
CAPSULE ORAL
Qty: 30 CAPSULE | Refills: 0 | Status: SHIPPED | OUTPATIENT
Start: 2021-08-07 | End: 2021-09-07

## 2021-08-20 ENCOUNTER — TELEPHONE (OUTPATIENT)
Dept: INTERNAL MEDICINE CLINIC | Age: 86
End: 2021-08-20

## 2021-08-20 NOTE — TELEPHONE ENCOUNTER
----- Message from [facility] sent at 8/20/2021  1:38 PM EDT -----  Subject: Message to Provider    QUESTIONS  Information for Provider? Mark's son will be in town 8/25-8/30 and he will   be taking his parents to their appointments. He would like to schedule an   additional appointment in addition to the ones they have scheduled on 8/30   so that he can get his parents immunizations, labs, and vaccinations up to   date. There is only one available appointment on 8/30 and i can not   schedule both of them.  ---------------------------------------------------------------------------  --------------  CALL BACK INFO  What is the best way for the office to contact you? OK to leave message on   yeppt, OK to respond with electronic message via [employer] portal (only   for patients who have registered [employer] account)  Preferred Call Back Phone Number? [phone]  ---------------------------------------------------------------------------  --------------  SCRIPT ANSWERS  Relationship to Patient?  Self

## 2021-08-25 ENCOUNTER — TELEPHONE (OUTPATIENT)
Dept: INTERNAL MEDICINE CLINIC | Age: 86
End: 2021-08-25

## 2021-08-25 ASSESSMENT — PATIENT HEALTH QUESTIONNAIRE - PHQ9
2. FEELING DOWN, DEPRESSED OR HOPELESS: 0
SUM OF ALL RESPONSES TO PHQ QUESTIONS 1-9: 0
SUM OF ALL RESPONSES TO PHQ QUESTIONS 1-9: 0
SUM OF ALL RESPONSES TO PHQ9 QUESTIONS 1 & 2: 0
1. LITTLE INTEREST OR PLEASURE IN DOING THINGS: 0
SUM OF ALL RESPONSES TO PHQ QUESTIONS 1-9: 0

## 2021-08-25 ASSESSMENT — LIFESTYLE VARIABLES
HOW OFTEN DURING THE LAST YEAR HAVE YOU FOUND THAT YOU WERE NOT ABLE TO STOP DRINKING ONCE YOU HAD STARTED: NEVER
HOW OFTEN DURING THE LAST YEAR HAVE YOU NEEDED AN ALCOHOLIC DRINK FIRST THING IN THE MORNING TO GET YOURSELF GOING AFTER A NIGHT OF HEAVY DRINKING: NEVER
HOW OFTEN DURING THE LAST YEAR HAVE YOU NEEDED AN ALCOHOLIC DRINK FIRST THING IN THE MORNING TO GET YOURSELF GOING AFTER A NIGHT OF HEAVY DRINKING: 0
HOW OFTEN DURING THE LAST YEAR HAVE YOU FOUND THAT YOU WERE NOT ABLE TO STOP DRINKING ONCE YOU HAD STARTED: 0
HOW OFTEN DURING THE LAST YEAR HAVE YOU FAILED TO DO WHAT WAS NORMALLY EXPECTED FROM YOU BECAUSE OF DRINKING: NEVER
HOW OFTEN DO YOU HAVE SIX OR MORE DRINKS ON ONE OCCASION: NEVER
HOW OFTEN DURING THE LAST YEAR HAVE YOU FAILED TO DO WHAT WAS NORMALLY EXPECTED FROM YOU BECAUSE OF DRINKING: 0
HOW MANY STANDARD DRINKS CONTAINING ALCOHOL DO YOU HAVE ON A TYPICAL DAY: 0
HOW MANY STANDARD DRINKS CONTAINING ALCOHOL DO YOU HAVE ON A TYPICAL DAY: ONE OR TWO
HAVE YOU OR SOMEONE ELSE BEEN INJURED AS A RESULT OF YOUR DRINKING: 0
HOW OFTEN DURING THE LAST YEAR HAVE YOU BEEN UNABLE TO REMEMBER WHAT HAPPENED THE NIGHT BEFORE BECAUSE YOU HAD BEEN DRINKING: NEVER
HAVE YOU OR SOMEONE ELSE BEEN INJURED AS A RESULT OF YOUR DRINKING: NO
AUDIT-C TOTAL SCORE: 0
HOW OFTEN DO YOU HAVE A DRINK CONTAINING ALCOHOL: 2
HOW OFTEN DURING THE LAST YEAR HAVE YOU HAD A FEELING OF GUILT OR REMORSE AFTER DRINKING: NEVER
HOW OFTEN DURING THE LAST YEAR HAVE YOU HAD A FEELING OF GUILT OR REMORSE AFTER DRINKING: 0
AUDIT TOTAL SCORE: 0
HOW OFTEN DURING THE LAST YEAR HAVE YOU BEEN UNABLE TO REMEMBER WHAT HAPPENED THE NIGHT BEFORE BECAUSE YOU HAD BEEN DRINKING: 0
HOW OFTEN DO YOU HAVE A DRINK CONTAINING ALCOHOL: TWO TO FOUR TIMES A MONTH
HAS A RELATIVE, FRIEND, DOCTOR, OR ANOTHER HEALTH PROFESSIONAL EXPRESSED CONCERN ABOUT YOUR DRINKING OR SUGGESTED YOU CUT DOWN: NO
HOW OFTEN DO YOU HAVE SIX OR MORE DRINKS ON ONE OCCASION: 0
HAS A RELATIVE, FRIEND, DOCTOR, OR ANOTHER HEALTH PROFESSIONAL EXPRESSED CONCERN ABOUT YOUR DRINKING OR SUGGESTED YOU CUT DOWN: 0
AUDIT TOTAL SCORE: 2
AUDIT-C TOTAL SCORE: 2

## 2021-08-25 NOTE — TELEPHONE ENCOUNTER
----- Message from Taya Nick sent at 8/25/2021  9:15 AM EDT -----  Subject: Referral Request    QUESTIONS   Reason for referral request? Patient is requesting Shingles vaccine,   Potassium monitoring, and Creatine monitoring. Has the physician seen you for this condition before? No   Preferred Specialist (if applicable)? Do you already have an appointment scheduled? Yes  Select Scheduled Date? 2021-08-30  Select Scheduled Physician? Ana Rosa Emery   Additional Information for Provider?   ---------------------------------------------------------------------------  --------------  Harinder LINTON  What is the best way for the office to contact you? OK to leave message on   voicemail  Preferred Call Back Phone Number?  [phone]

## 2021-08-30 ENCOUNTER — OFFICE VISIT (OUTPATIENT)
Dept: INTERNAL MEDICINE CLINIC | Age: 86
End: 2021-08-30
Payer: MEDICARE

## 2021-08-30 VITALS
SYSTOLIC BLOOD PRESSURE: 126 MMHG | HEIGHT: 74 IN | BODY MASS INDEX: 15.02 KG/M2 | WEIGHT: 117 LBS | DIASTOLIC BLOOD PRESSURE: 82 MMHG

## 2021-08-30 DIAGNOSIS — Z23 NEED FOR INFLUENZA VACCINATION: ICD-10-CM

## 2021-08-30 DIAGNOSIS — H61.23 BILATERAL IMPACTED CERUMEN: ICD-10-CM

## 2021-08-30 DIAGNOSIS — R68.89 FORGETFULNESS: ICD-10-CM

## 2021-08-30 DIAGNOSIS — N40.0 BENIGN PROSTATIC HYPERPLASIA WITHOUT LOWER URINARY TRACT SYMPTOMS: ICD-10-CM

## 2021-08-30 DIAGNOSIS — Z00.00 ROUTINE GENERAL MEDICAL EXAMINATION AT A HEALTH CARE FACILITY: Primary | ICD-10-CM

## 2021-08-30 DIAGNOSIS — I10 ESSENTIAL HYPERTENSION: ICD-10-CM

## 2021-08-30 PROCEDURE — G0439 PPPS, SUBSEQ VISIT: HCPCS | Performed by: HOSPITALIST

## 2021-08-30 PROCEDURE — 90694 VACC AIIV4 NO PRSRV 0.5ML IM: CPT | Performed by: HOSPITALIST

## 2021-08-30 PROCEDURE — G0008 ADMIN INFLUENZA VIRUS VAC: HCPCS | Performed by: HOSPITALIST

## 2021-08-30 PROCEDURE — 4040F PNEUMOC VAC/ADMIN/RCVD: CPT | Performed by: HOSPITALIST

## 2021-08-30 PROCEDURE — 1123F ACP DISCUSS/DSCN MKR DOCD: CPT | Performed by: HOSPITALIST

## 2021-08-30 SDOH — ECONOMIC STABILITY: FOOD INSECURITY: WITHIN THE PAST 12 MONTHS, YOU WORRIED THAT YOUR FOOD WOULD RUN OUT BEFORE YOU GOT MONEY TO BUY MORE.: NEVER TRUE

## 2021-08-30 SDOH — ECONOMIC STABILITY: FOOD INSECURITY: WITHIN THE PAST 12 MONTHS, THE FOOD YOU BOUGHT JUST DIDN'T LAST AND YOU DIDN'T HAVE MONEY TO GET MORE.: NEVER TRUE

## 2021-08-30 ASSESSMENT — SOCIAL DETERMINANTS OF HEALTH (SDOH): HOW HARD IS IT FOR YOU TO PAY FOR THE VERY BASICS LIKE FOOD, HOUSING, MEDICAL CARE, AND HEATING?: NOT HARD AT ALL

## 2021-08-30 NOTE — PROGRESS NOTES
Medicare Annual Wellness Visit  Name: Adam Townsend Date: 2021   MRN: <[id number]> Sex: Male   Age: 80 y.o. Ethnicity: Non- / Non    : 1934 Race: White (non-)      Mark Burton is here for Medicare AWV    Screenings for behavioral, psychosocial and functional/safety risks, and cognitive dysfunction are all negative except as indicated below. These results, as well as other patient data from the [address] form, are documented in Flowsheets linked to this Encounter. No Known Allergies      Prior to Visit Medications    Medication Sig Taking? Authorizing Provider   tamsulosin (FLOMAX) 0.4 MG capsule NEEDS APPOINTMENT - TAKE ONE TABLET BY MOUTH DAILY Yes Michelle Mcallister MD   lisinopril (PRINIVIL;ZESTRIL) 5 MG tablet TAKE 1 TABLET BY MOUTH EVERY DAY Yes Michelle Mcallister MD   Multiple Vitamin (MULTIVITAMIN PO) Take  by mouth. Yes Historical Provider, MD         Past Medical History:   Diagnosis Date    Bilateral pneumonia 2017    BPH (benign prostatic hyperplasia)        No past surgical history on file. Family History   Problem Relation Age of Onset    Breast Cancer Mother     Heart Failure Father        CareTeam (Including outside providers/suppliers regularly involved in providing care):   Patient Care Team:  Michelle Mcallister MD as PCP - General (Internal Medicine)  Michelle Mcallister MD as PCP - REHABILITATION [facility] Empaneled Provider    Wt Readings from Last 3 Encounters:   21 117 lb (53.1 kg)   20 112 lb (50.8 kg)   20 125 lb (56.7 kg)     Vitals:    21 0903   BP: 126/82   Weight: 117 lb (53.1 kg)   Height: 6' 1.5\" (1.867 m)     Body mass index is 15.23 kg/m². Based upon direct observation of the patient, evaluation of cognition reveals remote memory intact, recent memory impaired.     General Appearance: alert and oriented to person, place and time, well developed and well- nourished, in no acute distress  Skin: warm and dry, no rash or erythema  Head: normocephalic and atraumatic  Eyes: pupils equal, round, and reactive to light, extraocular eye movements intact, conjunctivae normal  ENT: + Bilateral external auditory canal cerumen impaction. Nose without deformity, nasal mucosa and turbinates normal without polyps  Neck: supple and non-tender without mass, no thyromegaly or thyroid nodules, no cervical lymphadenopathy  Pulmonary/Chest: clear to auscultation bilaterally- no wheezes, rales or rhonchi, normal air movement, no respiratory distress  Cardiovascular: normal rate, regular rhythm, normal S1 and S2, no murmurs, rubs, clicks, or gallops, distal pulses intact, no carotid bruits  Abdomen: soft, non-tender, non-distended, normal bowel sounds, no masses or organomegaly  Extremities: no cyanosis, clubbing or edema  Musculoskeletal: normal range of motion, no joint swelling, deformity or tenderness  Neurologic: reflexes normal and symmetric, no cranial nerve deficit, gait, coordination and speech normal    Patient's complete Health Risk Assessment and screening values have been reviewed and are found in Flowsheets. The following problems were reviewed today and where indicated follow up appointments were made and/or referrals ordered. HTN- checks his BP once/ week. It is usually well controlled. BPH -that Flomax controlled his symptoms well. Forgetfulness -per the patient's spouse and his daughter, Neha Soria, the patient appears to become more forgetful and has some short term memory deficits. Does not have previous history of dementia. Still able to perform his activities of daily living. Positive Risk Factor Screenings with Interventions:            General Health and ACP:  General  In general, how would you say your health is?: Good  In the past 7 days, have you experienced any of the following?  New or Increased Pain, New or Increased Fatigue, Loneliness, Social Isolation, Stress or Anger?: None of These  Do you get the social and emotional support Annual Wellness Visit (AWV)  Never done    Shingles Vaccine (3 of 3) 03/30/2020    Potassium monitoring  08/24/2021    Creatinine monitoring  08/24/2021    DTaP/Tdap/Td vaccine (2 - Td or Tdap) 04/07/2026    Flu vaccine  Completed    Pneumococcal 65+ years Vaccine  Completed    COVID-19 Vaccine  Completed    Hepatitis A vaccine  Aged Out    Hepatitis B vaccine  Aged Out    Hib vaccine  Aged Out    Meningococcal (ACWY) vaccine  Aged Out     Recommendations for [employer] Due: see orders and patient instructions/AVS.  . Recommended screening schedule for the next 5-10 years is provided to the patient in written form: see Patient Instructions/AVS.    Gene was seen today for medicare awv. Diagnoses and all orders for this visit:    Routine general medical examination at a health care facility      -      Encouraged the patient to increase his regular exercise activities    Essential hypertension  -     CBC Auto Differential; Future  -     Comprehensive Metabolic Panel; Future  -     Lipid Panel; Future  -     TSH without Reflex; Future  -     Continue current medications    Benign prostatic hyperplasia without lower urinary tract symptoms         -     Controlled; continue oral Flomax    Bilateral impacted cerumen          -    The patient will have a nursing visit in 2-3 weeks for bilateral external auditory canal irrigation for cerumen impaction treatment    Forgetfulness  -     TSH without Reflex; Future  -     VITAMIN B12; Future  -     RPR with FTA Relex;  Future    Need for influenza vaccination  -     INFLUENZA, QUADV, ADJUVANTED, 65 YRS =, IM, PF, PREFILL SYR, 0.5ML (FLUAD)      Follow-up in 3 to 4 weeks for evaluation of possible dementia    Dennise Serrato MD

## 2021-08-30 NOTE — PATIENT INSTRUCTIONS
Personalized Preventive Plan for Mark PEREZ [facility] - 8/30/2021  Medicare offers a range of preventive health benefits. Some of the tests and screenings are paid in full while other may be subject to a deductible, co-insurance, and/or copay. Some of these benefits include a comprehensive review of your medical history including lifestyle, illnesses that may run in your family, and various assessments and screenings as appropriate. After reviewing your medical record and screening and assessments performed today your provider may have ordered immunizations, labs, imaging, and/or referrals for you. A list of these orders (if applicable) as well as your Preventive Care list are included within your After Visit Summary for your review. Other Preventive Recommendations:    · A preventive eye exam performed by an eye specialist is recommended every 1-2 years to screen for glaucoma; cataracts, macular degeneration, and other eye disorders. · A preventive dental visit is recommended every 6 months. · Try to get at least 150 minutes of exercise per week or 10,000 steps per day on a pedometer . · Order or download the FREE \"Exercise & Physical Activity: Your Everyday Guide\" from [employer]. Call 9-405.265.9040 or search [employer] online. · You need 8364-9737 mg of calcium and 6658-7255 IU of vitamin D per day. It is possible to meet your calcium requirement with diet alone, but a vitamin D supplement is usually necessary to meet this goal.  · When exposed to the sun, use a sunscreen that protects against both UVA and UVB radiation with an SPF of 30 or greater. Reapply every 2 to 3 hours or after sweating, drying off with a towel, or swimming. · Always wear a seat belt when traveling in a car. Always wear a helmet when riding a bicycle or motorcycle.

## 2021-09-07 RX ORDER — TAMSULOSIN HYDROCHLORIDE 0.4 MG/1
CAPSULE ORAL
Qty: 30 CAPSULE | Refills: 3 | Status: SHIPPED | OUTPATIENT
Start: 2021-09-07

## 2021-09-13 ENCOUNTER — OFFICE VISIT (OUTPATIENT)
Dept: INTERNAL MEDICINE CLINIC | Age: 86
End: 2021-09-13
Payer: MEDICARE

## 2021-09-13 VITALS
BODY MASS INDEX: 14.76 KG/M2 | DIASTOLIC BLOOD PRESSURE: 70 MMHG | WEIGHT: 115 LBS | HEIGHT: 74 IN | SYSTOLIC BLOOD PRESSURE: 124 MMHG

## 2021-09-13 DIAGNOSIS — N40.0 BENIGN PROSTATIC HYPERPLASIA WITHOUT LOWER URINARY TRACT SYMPTOMS: ICD-10-CM

## 2021-09-13 DIAGNOSIS — R68.89 FORGETFULNESS: ICD-10-CM

## 2021-09-13 DIAGNOSIS — R63.6 UNDERWEIGHT: ICD-10-CM

## 2021-09-13 DIAGNOSIS — H61.23 BILATERAL IMPACTED CERUMEN: ICD-10-CM

## 2021-09-13 DIAGNOSIS — I10 ESSENTIAL HYPERTENSION: Primary | ICD-10-CM

## 2021-09-13 PROCEDURE — 4040F PNEUMOC VAC/ADMIN/RCVD: CPT | Performed by: HOSPITALIST

## 2021-09-13 PROCEDURE — 1036F TOBACCO NON-USER: CPT | Performed by: HOSPITALIST

## 2021-09-13 PROCEDURE — G8419 CALC BMI OUT NRM PARAM NOF/U: HCPCS | Performed by: HOSPITALIST

## 2021-09-13 PROCEDURE — G8427 DOCREV CUR MEDS BY ELIG CLIN: HCPCS | Performed by: HOSPITALIST

## 2021-09-13 PROCEDURE — 99214 OFFICE O/P EST MOD 30 MIN: CPT | Performed by: HOSPITALIST

## 2021-09-13 PROCEDURE — 1123F ACP DISCUSS/DSCN MKR DOCD: CPT | Performed by: HOSPITALIST

## 2021-09-13 NOTE — PROGRESS NOTES
cerumen. Mouth/Throat:      Mouth: Mucous membranes are moist.      Pharynx: Oropharynx is clear. No oropharyngeal exudate. Eyes:      General: No scleral icterus. Pupils: Pupils are equal, round, and reactive to light. Neck:      Vascular: No JVD. Cardiovascular:      Rate and Rhythm: Normal rate and regular rhythm. Heart sounds: Normal heart sounds. No murmur heard. No friction rub. No gallop. Pulmonary:      Effort: Pulmonary effort is normal. No respiratory distress. Breath sounds: Normal breath sounds. No wheezing or rales. Abdominal:      General: Bowel sounds are normal. There is no distension. Palpations: Abdomen is soft. Tenderness: There is no abdominal tenderness. There is no right CVA tenderness or left CVA tenderness. Musculoskeletal:         General: Normal range of motion. Cervical back: Normal range of motion. Right lower leg: No edema. Left lower leg: No edema. Skin:     General: Skin is warm and dry. Neurological:      General: No focal deficit present. Mental Status: He is alert and oriented to person, place, and time. Cranial Nerves: No cranial nerve deficit. Sensory: No sensory deficit. Psychiatric:         Mood and Affect: Mood normal.         Assessment/ plan:     Mark was seen today for discuss labs. Diagnoses and all orders for this visit:    Essential hypertension     -     Controlled; continue oral lisinopril    Forgetfulness      -     No signs of obvious dementia. We will try OTC Prevagen    Benign prostatic hyperplasia without lower urinary tract symptoms       -    Continue oral Flomax    Bilateral impacted cerumen  -     Mary Kay Gardner MD, Otolaryngology, [facility]         -     Encouraged to drink 1 can of Boost or Ensure twice a day with meals    Return in about 6 months (around 3/13/2022) for HTN, forgetfulness, BPH.     Ferdinand Callejas MD

## 2021-09-20 ENCOUNTER — OFFICE VISIT (OUTPATIENT)
Dept: ENT CLINIC | Age: 86
End: 2021-09-20
Payer: MEDICARE

## 2021-09-20 VITALS
BODY MASS INDEX: 15.24 KG/M2 | DIASTOLIC BLOOD PRESSURE: 85 MMHG | HEART RATE: 82 BPM | OXYGEN SATURATION: 98 % | HEIGHT: 73 IN | WEIGHT: 115 LBS | TEMPERATURE: 98.2 F | SYSTOLIC BLOOD PRESSURE: 132 MMHG

## 2021-09-20 DIAGNOSIS — H61.23 BILATERAL IMPACTED CERUMEN: ICD-10-CM

## 2021-09-20 DIAGNOSIS — H93.8X3 SENSATION OF FULLNESS IN BOTH EARS: Primary | ICD-10-CM

## 2021-09-20 PROCEDURE — 1036F TOBACCO NON-USER: CPT | Performed by: OTOLARYNGOLOGY

## 2021-09-20 PROCEDURE — 1123F ACP DISCUSS/DSCN MKR DOCD: CPT | Performed by: OTOLARYNGOLOGY

## 2021-09-20 PROCEDURE — G8419 CALC BMI OUT NRM PARAM NOF/U: HCPCS | Performed by: OTOLARYNGOLOGY

## 2021-09-20 PROCEDURE — 99203 OFFICE O/P NEW LOW 30 MIN: CPT | Performed by: OTOLARYNGOLOGY

## 2021-09-20 PROCEDURE — 69210 REMOVE IMPACTED EAR WAX UNI: CPT | Performed by: OTOLARYNGOLOGY

## 2021-09-20 PROCEDURE — 4040F PNEUMOC VAC/ADMIN/RCVD: CPT | Performed by: OTOLARYNGOLOGY

## 2021-09-20 PROCEDURE — G8427 DOCREV CUR MEDS BY ELIG CLIN: HCPCS | Performed by: OTOLARYNGOLOGY

## 2021-09-20 NOTE — PROGRESS NOTES
CHIEF COMPLAINT: In both ears. HISTORY OF PRESENT ILLNESS:  80 y.o. male who presents with fullness in both ears. The right is worse than the left. Patient denies any ear pain or otorrhea. The patient does not complain of hearing loss. PAST MEDICAL HISTORY:   Social History     Tobacco Use   Smoking Status Never Smoker   Smokeless Tobacco Never Used                                                    Social History     Substance and Sexual Activity   Alcohol Use No                                                    Current Outpatient Medications:     tamsulosin (FLOMAX) 0.4 MG capsule, NEEDS APPOINTMENT - TAKE ONE TABLET BY MOUTH DAILY, Disp: 30 capsule, Rfl: 3    lisinopril (PRINIVIL;ZESTRIL) 5 MG tablet, TAKE 1 TABLET BY MOUTH EVERY DAY, Disp: 90 tablet, Rfl: 0    Multiple Vitamin (MULTIVITAMIN PO), Take  by mouth.  , Disp: , Rfl:                                                  Past Medical History:   Diagnosis Date    Bilateral pneumonia 11/2017    BPH (benign prostatic hyperplasia)                                                   History reviewed. No pertinent surgical history. FAMILY HISTORY: Family history reviewed. Except as noted in history of present illness, there is no pertinent family history      REVIEW OF SYSTEMS:  All pertinent positive and negative review of systems included in HPI. Otherwise, all systems are reviewed and negative. PHYSICAL EXAMINATION:   GENERAL: wdwn- no acute distress  RESPIRATORY:  No stridor or respiratory distress  COMMUNICATION :  Normal voice  MENTAL STATUS:  Mood and affect normal, oriented X 3  HEAD AND FACE:  No abnormalities of the skin of face or head  EXTERNAL EARS AND NOSE:  Normal pinnae bilateral  FACIAL MUSCLES:  All branches of facial nerve intact  EXTRAOCULAR MUSCLES: Intact with full range of motion  FACE PALPATION:  No tenderness over sinuses.   Zygomatic arches and orbital rims intact  OTOSCOPY: Cerumen occludes both external auditory canals. TUNING FORKS: Rinne ++ Pham midline at 512 Hz  INTRANASAL:  Septum midline, turbinates normal, meati clear. LIPS, TEETH, GINGIVA:  Normal mucosa  PHARYNX:  Normal  NECK:  No masses. LYMPHATIC:  No cervical adenopathy  SALIVARY GLANDS:  No swelling or masses in the parotid or submandibular salivary glands  THYROID:  No goiter or thyroid masses. IMPRESSION: Impacted cerumen bilateral.    PLAN: Cerumen removed from both ears using curettes and suction's. The tympanic membranes and middle ear spaces are normal.    FOLLOW-UP: As needed.

## 2021-10-26 RX ORDER — LISINOPRIL 5 MG/1
TABLET ORAL
Qty: 90 TABLET | Refills: 0 | Status: ON HOLD | OUTPATIENT
Start: 2021-10-26 | End: 2022-01-04 | Stop reason: HOSPADM

## 2021-12-30 ENCOUNTER — APPOINTMENT (OUTPATIENT)
Dept: CT IMAGING | Age: 86
DRG: 070 | End: 2021-12-30
Payer: MEDICARE

## 2021-12-30 ENCOUNTER — APPOINTMENT (OUTPATIENT)
Dept: GENERAL RADIOLOGY | Age: 86
DRG: 070 | End: 2021-12-30
Payer: MEDICARE

## 2021-12-30 ENCOUNTER — HOSPITAL ENCOUNTER (INPATIENT)
Age: 86
LOS: 6 days | Discharge: HOSPICE | DRG: 070 | End: 2022-01-05
Attending: EMERGENCY MEDICINE | Admitting: HOSPITALIST
Payer: MEDICARE

## 2021-12-30 DIAGNOSIS — N39.0 URINARY TRACT INFECTION WITHOUT HEMATURIA, SITE UNSPECIFIED: ICD-10-CM

## 2021-12-30 DIAGNOSIS — G93.41 ACUTE METABOLIC ENCEPHALOPATHY: ICD-10-CM

## 2021-12-30 DIAGNOSIS — G93.40 ENCEPHALOPATHY: ICD-10-CM

## 2021-12-30 LAB
ALBUMIN SERPL-MCNC: 4.4 G/DL (ref 3.4–5)
ALP BLD-CCNC: 93 U/L (ref 40–129)
ALT SERPL-CCNC: 19 U/L (ref 10–40)
ANION GAP SERPL CALCULATED.3IONS-SCNC: 10 MMOL/L (ref 3–16)
AST SERPL-CCNC: 30 U/L (ref 15–37)
BACTERIA: ABNORMAL /HPF
BASE EXCESS VENOUS: 3.1 MMOL/L (ref -2–3)
BASOPHILS ABSOLUTE: 0 K/UL (ref 0–0.2)
BASOPHILS RELATIVE PERCENT: 0.4 %
BILIRUB SERPL-MCNC: 0.6 MG/DL (ref 0–1)
BILIRUBIN DIRECT: <0.2 MG/DL (ref 0–0.3)
BILIRUBIN URINE: NEGATIVE
BILIRUBIN, INDIRECT: NORMAL MG/DL (ref 0–1)
BLOOD, URINE: ABNORMAL
BUN BLDV-MCNC: 20 MG/DL (ref 7–20)
CALCIUM SERPL-MCNC: 8.8 MG/DL (ref 8.3–10.6)
CARBOXYHEMOGLOBIN: 1 % (ref 0–1.5)
CHLORIDE BLD-SCNC: 102 MMOL/L (ref 99–110)
CLARITY: CLEAR
CO2: 25 MMOL/L (ref 21–32)
COLOR: YELLOW
CREAT SERPL-MCNC: 0.6 MG/DL (ref 0.8–1.3)
EKG ATRIAL RATE: 102 BPM
EKG DIAGNOSIS: NORMAL
EKG P AXIS: 83 DEGREES
EKG P-R INTERVAL: 180 MS
EKG Q-T INTERVAL: 332 MS
EKG QRS DURATION: 78 MS
EKG QTC CALCULATION (BAZETT): 432 MS
EKG R AXIS: 76 DEGREES
EKG T AXIS: 85 DEGREES
EKG VENTRICULAR RATE: 102 BPM
EOSINOPHILS ABSOLUTE: 0 K/UL (ref 0–0.6)
EOSINOPHILS RELATIVE PERCENT: 0.5 %
EPITHELIAL CELLS, UA: ABNORMAL /HPF (ref 0–5)
GFR AFRICAN AMERICAN: >60
GFR NON-AFRICAN AMERICAN: >60
GLUCOSE BLD-MCNC: 97 MG/DL (ref 70–99)
GLUCOSE URINE: NEGATIVE MG/DL
HCO3 VENOUS: 29.4 MMOL/L (ref 24–28)
HCT VFR BLD CALC: 40.4 % (ref 40.5–52.5)
HEMOGLOBIN, VEN, REDUCED: 26.9 %
HEMOGLOBIN: 13.5 G/DL (ref 13.5–17.5)
KETONES, URINE: 15 MG/DL
LACTIC ACID, SEPSIS: 1.1 MMOL/L (ref 0.4–1.9)
LACTIC ACID, SEPSIS: 1.6 MMOL/L (ref 0.4–1.9)
LEUKOCYTE ESTERASE, URINE: NEGATIVE
LYMPHOCYTES ABSOLUTE: 0.6 K/UL (ref 1–5.1)
LYMPHOCYTES RELATIVE PERCENT: 8.1 %
MCH RBC QN AUTO: 30.6 PG (ref 26–34)
MCHC RBC AUTO-ENTMCNC: 33.4 G/DL (ref 31–36)
MCV RBC AUTO: 91.9 FL (ref 80–100)
METHEMOGLOBIN VENOUS: 0.2 % (ref 0–1.5)
MICROSCOPIC EXAMINATION: YES
MONOCYTES ABSOLUTE: 1.1 K/UL (ref 0–1.3)
MONOCYTES RELATIVE PERCENT: 14 %
MUCUS: ABNORMAL /LPF
NEUTROPHILS ABSOLUTE: 5.9 K/UL (ref 1.7–7.7)
NEUTROPHILS RELATIVE PERCENT: 77 %
NITRITE, URINE: POSITIVE
O2 SAT, VEN: 73 %
PCO2, VEN: 50.7 MMHG (ref 41–51)
PDW BLD-RTO: 13.6 % (ref 12.4–15.4)
PH UA: 6.5 (ref 5–8)
PH VENOUS: 7.37 (ref 7.35–7.45)
PLATELET # BLD: 199 K/UL (ref 135–450)
PMV BLD AUTO: 8.7 FL (ref 5–10.5)
PO2, VEN: 39.8 MMHG (ref 25–40)
POTASSIUM REFLEX MAGNESIUM: 3.9 MMOL/L (ref 3.5–5.1)
PRO-BNP: 633 PG/ML (ref 0–449)
PROTEIN UA: 100 MG/DL
RBC # BLD: 4.4 M/UL (ref 4.2–5.9)
RBC UA: ABNORMAL /HPF (ref 0–4)
SODIUM BLD-SCNC: 137 MMOL/L (ref 136–145)
SPECIFIC GRAVITY UA: >=1.03 (ref 1–1.03)
TCO2 CALC VENOUS: 31 MMOL/L
TOTAL PROTEIN: 7.2 G/DL (ref 6.4–8.2)
TROPONIN: <0.01 NG/ML
URINE TYPE: ABNORMAL
UROBILINOGEN, URINE: 0.2 E.U./DL
WBC # BLD: 7.6 K/UL (ref 4–11)
WBC UA: ABNORMAL /HPF (ref 0–5)

## 2021-12-30 PROCEDURE — 96365 THER/PROPH/DIAG IV INF INIT: CPT

## 2021-12-30 PROCEDURE — 2060000000 HC ICU INTERMEDIATE R&B

## 2021-12-30 PROCEDURE — 85025 COMPLETE CBC W/AUTO DIFF WBC: CPT

## 2021-12-30 PROCEDURE — 82803 BLOOD GASES ANY COMBINATION: CPT

## 2021-12-30 PROCEDURE — 70450 CT HEAD/BRAIN W/O DYE: CPT

## 2021-12-30 PROCEDURE — U0005 INFEC AGEN DETEC AMPLI PROBE: HCPCS

## 2021-12-30 PROCEDURE — 80076 HEPATIC FUNCTION PANEL: CPT

## 2021-12-30 PROCEDURE — 71045 X-RAY EXAM CHEST 1 VIEW: CPT

## 2021-12-30 PROCEDURE — 83605 ASSAY OF LACTIC ACID: CPT

## 2021-12-30 PROCEDURE — 6360000002 HC RX W HCPCS: Performed by: EMERGENCY MEDICINE

## 2021-12-30 PROCEDURE — 96361 HYDRATE IV INFUSION ADD-ON: CPT

## 2021-12-30 PROCEDURE — 99284 EMERGENCY DEPT VISIT MOD MDM: CPT

## 2021-12-30 PROCEDURE — 87040 BLOOD CULTURE FOR BACTERIA: CPT

## 2021-12-30 PROCEDURE — U0003 INFECTIOUS AGENT DETECTION BY NUCLEIC ACID (DNA OR RNA); SEVERE ACUTE RESPIRATORY SYNDROME CORONAVIRUS 2 (SARS-COV-2) (CORONAVIRUS DISEASE [COVID-19]), AMPLIFIED PROBE TECHNIQUE, MAKING USE OF HIGH THROUGHPUT TECHNOLOGIES AS DESCRIBED BY CMS-2020-01-R: HCPCS

## 2021-12-30 PROCEDURE — 81001 URINALYSIS AUTO W/SCOPE: CPT

## 2021-12-30 PROCEDURE — 83880 ASSAY OF NATRIURETIC PEPTIDE: CPT

## 2021-12-30 PROCEDURE — 2580000003 HC RX 258: Performed by: EMERGENCY MEDICINE

## 2021-12-30 PROCEDURE — 93005 ELECTROCARDIOGRAM TRACING: CPT | Performed by: EMERGENCY MEDICINE

## 2021-12-30 PROCEDURE — 96375 TX/PRO/DX INJ NEW DRUG ADDON: CPT

## 2021-12-30 PROCEDURE — 80048 BASIC METABOLIC PNL TOTAL CA: CPT

## 2021-12-30 PROCEDURE — 84484 ASSAY OF TROPONIN QUANT: CPT

## 2021-12-30 PROCEDURE — 87086 URINE CULTURE/COLONY COUNT: CPT

## 2021-12-30 PROCEDURE — 2580000003 HC RX 258: Performed by: STUDENT IN AN ORGANIZED HEALTH CARE EDUCATION/TRAINING PROGRAM

## 2021-12-30 PROCEDURE — 36415 COLL VENOUS BLD VENIPUNCTURE: CPT

## 2021-12-30 RX ORDER — TAMSULOSIN HYDROCHLORIDE 0.4 MG/1
0.4 CAPSULE ORAL DAILY
Status: DISCONTINUED | OUTPATIENT
Start: 2021-12-31 | End: 2022-01-05 | Stop reason: HOSPADM

## 2021-12-30 RX ORDER — SODIUM CHLORIDE 0.9 % (FLUSH) 0.9 %
5-40 SYRINGE (ML) INJECTION EVERY 12 HOURS SCHEDULED
Status: DISCONTINUED | OUTPATIENT
Start: 2021-12-30 | End: 2022-01-05 | Stop reason: HOSPADM

## 2021-12-30 RX ORDER — ONDANSETRON 4 MG/1
4 TABLET, ORALLY DISINTEGRATING ORAL EVERY 8 HOURS PRN
Status: DISCONTINUED | OUTPATIENT
Start: 2021-12-30 | End: 2022-01-05 | Stop reason: HOSPADM

## 2021-12-30 RX ORDER — SODIUM CHLORIDE 9 MG/ML
25 INJECTION, SOLUTION INTRAVENOUS PRN
Status: DISCONTINUED | OUTPATIENT
Start: 2021-12-30 | End: 2022-01-05 | Stop reason: HOSPADM

## 2021-12-30 RX ORDER — POLYETHYLENE GLYCOL 3350 17 G/17G
17 POWDER, FOR SOLUTION ORAL DAILY PRN
Status: DISCONTINUED | OUTPATIENT
Start: 2021-12-30 | End: 2022-01-05 | Stop reason: HOSPADM

## 2021-12-30 RX ORDER — ACETAMINOPHEN 650 MG/1
650 SUPPOSITORY RECTAL EVERY 6 HOURS PRN
Status: DISCONTINUED | OUTPATIENT
Start: 2021-12-30 | End: 2022-01-05 | Stop reason: HOSPADM

## 2021-12-30 RX ORDER — ONDANSETRON 2 MG/ML
4 INJECTION INTRAMUSCULAR; INTRAVENOUS EVERY 6 HOURS PRN
Status: DISCONTINUED | OUTPATIENT
Start: 2021-12-30 | End: 2022-01-05 | Stop reason: HOSPADM

## 2021-12-30 RX ORDER — HALOPERIDOL 5 MG/ML
2 INJECTION INTRAMUSCULAR ONCE
Status: COMPLETED | OUTPATIENT
Start: 2021-12-30 | End: 2021-12-30

## 2021-12-30 RX ORDER — SODIUM CHLORIDE, SODIUM LACTATE, POTASSIUM CHLORIDE, AND CALCIUM CHLORIDE .6; .31; .03; .02 G/100ML; G/100ML; G/100ML; G/100ML
500 INJECTION, SOLUTION INTRAVENOUS ONCE
Status: COMPLETED | OUTPATIENT
Start: 2021-12-30 | End: 2021-12-30

## 2021-12-30 RX ORDER — SODIUM CHLORIDE 0.9 % (FLUSH) 0.9 %
5-40 SYRINGE (ML) INJECTION PRN
Status: DISCONTINUED | OUTPATIENT
Start: 2021-12-30 | End: 2022-01-05 | Stop reason: HOSPADM

## 2021-12-30 RX ORDER — LISINOPRIL 5 MG/1
5 TABLET ORAL DAILY
Status: DISCONTINUED | OUTPATIENT
Start: 2021-12-31 | End: 2022-01-05 | Stop reason: HOSPADM

## 2021-12-30 RX ORDER — ACETAMINOPHEN 325 MG/1
650 TABLET ORAL EVERY 6 HOURS PRN
Status: DISCONTINUED | OUTPATIENT
Start: 2021-12-30 | End: 2022-01-05 | Stop reason: HOSPADM

## 2021-12-30 RX ADMIN — SODIUM CHLORIDE, SODIUM LACTATE, POTASSIUM CHLORIDE, AND CALCIUM CHLORIDE 500 ML: .6; .31; .03; .02 INJECTION, SOLUTION INTRAVENOUS at 18:54

## 2021-12-30 RX ADMIN — SODIUM CHLORIDE, PRESERVATIVE FREE 10 ML: 5 INJECTION INTRAVENOUS at 23:06

## 2021-12-30 RX ADMIN — HALOPERIDOL LACTATE 2 MG: 5 INJECTION, SOLUTION INTRAMUSCULAR at 21:18

## 2021-12-30 RX ADMIN — DEXTROSE MONOHYDRATE 1000 MG: 5 INJECTION INTRAVENOUS at 21:19

## 2021-12-30 ASSESSMENT — ENCOUNTER SYMPTOMS
VOMITING: 0
ABDOMINAL PAIN: 0
COUGH: 0
SHORTNESS OF BREATH: 0

## 2021-12-30 NOTE — ED NOTES
Bed: A03-03  Expected date:   Expected time:   Means of arrival:   Comments:  Cadence Vail RN  12/30/21 2215

## 2021-12-30 NOTE — ED PROVIDER NOTES
Date of evaluation: 12/30/2021    Chief Complaint   CC: generalized weakness    Nursing Notes, Past Medical Hx, Past Surgical Hx, Social Hx, Allergies, and Family Hx were reviewed. History of Present Illness     Mark Burton is a 80 y.o. male who presents via ambulance \"because my wife was worried about me\". Per EMS they were called for confusion and generalized weakness and fatigue. The patient reports poor appetite but otherwise cannot provide any additional other meaningful history. He denies any pain including chest pain, abdominal pain, HA. Denies cough, SOB, focal weakness. I spoke with his wife who reports that he was lethargic and she was unable to wake him up for dinner so she called 911. She denies any recent trauma, recent fever, suspected Covid exposure. Review of Systems     Review of Systems   Constitutional: Positive for appetite change (decreased appetite) and fatigue. Negative for chills, diaphoresis, fever and unexpected weight change. HENT: Negative for congestion. Eyes: Negative for visual disturbance. Respiratory: Negative for cough and shortness of breath. Cardiovascular: Negative for chest pain, palpitations and leg swelling. Gastrointestinal: Negative for abdominal pain and vomiting. Neurological: Positive for weakness (generalized). Negative for dizziness, light-headedness and headaches. All other systems reviewed and are negative. Past Medical, Surgical, Family, and Social History     He has a past medical history of Bilateral pneumonia and BPH (benign prostatic hyperplasia). He has no past surgical history on file. His family history includes Breast Cancer in his mother; Heart Failure in his father. He reports that he has never smoked. He has never used smokeless tobacco. He reports that he does not drink alcohol and does not use drugs.     Medications     Current Discharge Medication List      CONTINUE these medications which have NOT CHANGED not to date, symmetric strength and sensation bilaterally   Psychiatric:         Mood and Affect: Mood normal.         Diagnostic Results     EKG   Indication: Altered mental status, rate: 102, sinus tachycardia, no ST elevation or depression, axis normal, intervals: , QRS 78, QTc 432, occasional PAC, no significant change when compared to prior from 4/2/2019    RADIOLOGY:  CT Head WO Contrast   Final Result      Cerebral atrophy. Evidence of significant diffuse chronic small vessel white matter disease bilaterally. No acute intracranial findings. XR CHEST PORTABLE   Final Result      No acute cardiopulmonary findings. LABS:   Labs Reviewed   CBC WITH AUTO DIFFERENTIAL - Abnormal; Notable for the following components:       Result Value    Hematocrit 40.4 (*)     Lymphocytes Absolute 0.6 (*)     All other components within normal limits    Narrative:     Performed at: [facility]  [address], [facility] [address]   Phone [phone]   BASIC METABOLIC PANEL W/ REFLEX TO MG FOR LOW K - Abnormal; Notable for the following components:    CREATININE 0.6 (*)     All other components within normal limits    Narrative:     Performed at: [facility]  [address], [facility] [address]   Phone [phone]   BRAIN NATRIURETIC PEPTIDE - Abnormal; Notable for the following components:    Pro- (*)     All other components within normal limits    Narrative:     Performed at: [facility]  [address], [facility] [address]   Phone [phone]   URINALYSIS - Abnormal; Notable for the following components:    Ketones, Urine 15 (*)     Blood, Urine LARGE (*)     Protein,  (*)     Nitrite, Urine POSITIVE (*)     All other components within normal limits    Narrative:     Performed at:   [facility] [facility]  [address] [address] [address]   Phone [phone]   BLOOD GAS, VENOUS - Abnormal; Notable for the following components:    HCO3, Venous 29.4 (*)     Base Excess, Reginald 3.1 (*)     All other components within normal limits    Narrative:     Performed at: [facility] - [facility]  [address] [address] [address]   Phone [phone]   MICROSCOPIC URINALYSIS - Abnormal; Notable for the following components:    Mucus, UA 1+ (*)     RBC, UA 11-20 (*)     Bacteria, UA 2+ (*)     All other components within normal limits    Narrative:     Performed at: [facility]  [address] [address] [address]   Phone [phone]   CULTURE, BLOOD 1   CULTURE, BLOOD 2   CULTURE, URINE   LACTATE, SEPSIS    Narrative:     Performed at: [facility]  [address] [address] [address]   Phone [phone]   HEPATIC FUNCTION PANEL    Narrative:     Performed at: [facility]  [address] [address] [address]   Phone [phone]   TROPONIN    Narrative:     Performed at: [facility]  [address] [address] [address]   Phone [phone]   LACTATE, SEPSIS    Narrative:     Performed at:   [facility]  [address] [address] [address]   Phone [phone]   [id number]   BASIC METABOLIC PANEL W/ REFLEX TO MG FOR LOW K   CBC       VITALS:  BP: 122/68, Temp: 98.1 °F (36.7 °C), Pulse: 98, Resp: 20     Procedures         ED Course     The patient was given the followingmedications:  Orders Placed This Encounter   Medications    lactated ringers bolus    haloperidol lactate (HALDOL) injection 2 mg    cefTRIAXone (ROCEPHIN) 1000 mg IVPB in 50 mL D5W minibag     Order Specific Question:   Antimicrobial Indications     Answer: Urinary Tract Infection    lisinopril (PRINIVIL;ZESTRIL) tablet 5 mg    tamsulosin (FLOMAX) capsule 0.4 mg    sodium chloride flush 0.9 % injection 5-40 mL    sodium chloride flush 0.9 % injection 5-40 mL    0.9 % sodium chloride infusion    enoxaparin (LOVENOX) injection 40 mg    OR Linked Order Group     ondansetron (ZOFRAN-ODT) disintegrating tablet 4 mg     ondansetron (ZOFRAN) injection 4 mg    polyethylene glycol (GLYCOLAX) packet 17 g    OR Linked Order Group     acetaminophen (TYLENOL) tablet 650 mg     acetaminophen (TYLENOL) suppository 650 mg    cefTRIAXone (ROCEPHIN) 1000 mg IVPB in 50 mL D5W minibag     Order Specific Question:   Antimicrobial Indications     Answer:   Urinary Tract Infection            CONSULTS:  IP CONSULT TO PRIMARY CARE PROVIDER  IP CONSULT TO RESIDENT INTERNAL MEDICINE  IP CONSULT TO [address]     Mark Ferro is a 80 y.o. male presenting with altered mental status. Upon presentation, he was somewhat encephalopathic but did not have any focal neurologic deficits. CT head shows cerebral atrophy but no evidence of acute intracranial findings. Chest x-ray was unremarkable. He was mildly tachycardic and has some dry mucous membranes. He was given lactated Ringer's for fluid resuscitation. Laboratory assessment was notable for positive nitrates in his urinalysis. I suspect his underlying urinary tract infection may be playing a role in his altered mental status. He was empirically given Rocephin. Culture is added on. EKG, troponin, BNP, hepatic function panel, BMP, CBC, lactic acid is otherwise unremarkable. I do not feel that he is septic. I feel his mild tachycardia was likely from hypovolemia. He is somewhat cachectic. Became somewhat agitated in the emergency department and required low-dose haloperidol for chemical restraint. He has been admitted. I discussed his care with the hospitalist and residents.      Clinical Impression     1. Encephalopathy    2.  Urinary tract infection without hematuria, site unspecified        /Plan       DISPOSITION   Admitted 12/30/2021         Rivera Baires MD  12/31/21 5209

## 2021-12-31 LAB
ALBUMIN SERPL-MCNC: 3.2 G/DL (ref 3.4–5)
ALP BLD-CCNC: 68 U/L (ref 40–129)
ALT SERPL-CCNC: 14 U/L (ref 10–40)
ANION GAP SERPL CALCULATED.3IONS-SCNC: 9 MMOL/L (ref 3–16)
AST SERPL-CCNC: 26 U/L (ref 15–37)
BILIRUB SERPL-MCNC: 0.4 MG/DL (ref 0–1)
BILIRUBIN DIRECT: <0.2 MG/DL (ref 0–0.3)
BILIRUBIN, INDIRECT: ABNORMAL MG/DL (ref 0–1)
BUN BLDV-MCNC: 16 MG/DL (ref 7–20)
CALCIUM SERPL-MCNC: 8.1 MG/DL (ref 8.3–10.6)
CHLORIDE BLD-SCNC: 103 MMOL/L (ref 99–110)
CO2: 26 MMOL/L (ref 21–32)
CREAT SERPL-MCNC: <0.5 MG/DL (ref 0.8–1.3)
FOLATE: 14.24 NG/ML (ref 4.78–24.2)
GFR AFRICAN AMERICAN: >60
GFR NON-AFRICAN AMERICAN: >60
GLUCOSE BLD-MCNC: 103 MG/DL (ref 70–99)
HCT VFR BLD CALC: 35.2 % (ref 40.5–52.5)
HEMOGLOBIN: 12 G/DL (ref 13.5–17.5)
MCH RBC QN AUTO: 30.8 PG (ref 26–34)
MCHC RBC AUTO-ENTMCNC: 34.2 G/DL (ref 31–36)
MCV RBC AUTO: 90 FL (ref 80–100)
PDW BLD-RTO: 13.1 % (ref 12.4–15.4)
PLATELET # BLD: 176 K/UL (ref 135–450)
PMV BLD AUTO: 8.7 FL (ref 5–10.5)
POTASSIUM REFLEX MAGNESIUM: 3.6 MMOL/L (ref 3.5–5.1)
RBC # BLD: 3.91 M/UL (ref 4.2–5.9)
SARS-COV-2: DETECTED
SODIUM BLD-SCNC: 138 MMOL/L (ref 136–145)
TOTAL PROTEIN: 5.8 G/DL (ref 6.4–8.2)
TSH REFLEX: 2.26 UIU/ML (ref 0.27–4.2)
URINE CULTURE, ROUTINE: NORMAL
VITAMIN B-12: 676 PG/ML (ref 211–911)
WBC # BLD: 6.2 K/UL (ref 4–11)

## 2021-12-31 PROCEDURE — 80076 HEPATIC FUNCTION PANEL: CPT

## 2021-12-31 PROCEDURE — 36415 COLL VENOUS BLD VENIPUNCTURE: CPT

## 2021-12-31 PROCEDURE — 2580000003 HC RX 258: Performed by: STUDENT IN AN ORGANIZED HEALTH CARE EDUCATION/TRAINING PROGRAM

## 2021-12-31 PROCEDURE — 99222 1ST HOSP IP/OBS MODERATE 55: CPT | Performed by: HOSPITALIST

## 2021-12-31 PROCEDURE — 2060000000 HC ICU INTERMEDIATE R&B

## 2021-12-31 PROCEDURE — 82746 ASSAY OF FOLIC ACID SERUM: CPT

## 2021-12-31 PROCEDURE — 92610 EVALUATE SWALLOWING FUNCTION: CPT

## 2021-12-31 PROCEDURE — 82607 VITAMIN B-12: CPT

## 2021-12-31 PROCEDURE — 6370000000 HC RX 637 (ALT 250 FOR IP): Performed by: STUDENT IN AN ORGANIZED HEALTH CARE EDUCATION/TRAINING PROGRAM

## 2021-12-31 PROCEDURE — 6360000002 HC RX W HCPCS: Performed by: STUDENT IN AN ORGANIZED HEALTH CARE EDUCATION/TRAINING PROGRAM

## 2021-12-31 PROCEDURE — 84443 ASSAY THYROID STIM HORMONE: CPT

## 2021-12-31 PROCEDURE — 80048 BASIC METABOLIC PNL TOTAL CA: CPT

## 2021-12-31 PROCEDURE — 85027 COMPLETE CBC AUTOMATED: CPT

## 2021-12-31 RX ORDER — SODIUM CHLORIDE 9 MG/ML
INJECTION, SOLUTION INTRAVENOUS CONTINUOUS
Status: ACTIVE | OUTPATIENT
Start: 2021-12-31 | End: 2021-12-31

## 2021-12-31 RX ORDER — HALOPERIDOL 5 MG/ML
1 INJECTION INTRAMUSCULAR ONCE
Status: COMPLETED | OUTPATIENT
Start: 2021-12-31 | End: 2021-12-31

## 2021-12-31 RX ADMIN — SODIUM CHLORIDE: 9 INJECTION, SOLUTION INTRAVENOUS at 12:01

## 2021-12-31 RX ADMIN — SODIUM CHLORIDE, PRESERVATIVE FREE 10 ML: 5 INJECTION INTRAVENOUS at 09:47

## 2021-12-31 RX ADMIN — HALOPERIDOL LACTATE 1 MG: 5 INJECTION, SOLUTION INTRAMUSCULAR at 00:38

## 2021-12-31 RX ADMIN — LISINOPRIL 5 MG: 5 TABLET ORAL at 09:46

## 2021-12-31 RX ADMIN — CEFTRIAXONE 1000 MG: 1 INJECTION, POWDER, FOR SOLUTION INTRAMUSCULAR; INTRAVENOUS at 20:16

## 2021-12-31 RX ADMIN — ENOXAPARIN SODIUM 40 MG: 100 INJECTION SUBCUTANEOUS at 09:46

## 2021-12-31 RX ADMIN — TAMSULOSIN HYDROCHLORIDE 0.4 MG: 0.4 CAPSULE ORAL at 09:46

## 2021-12-31 ASSESSMENT — PAIN SCALES - PAIN ASSESSMENT IN ADVANCED DEMENTIA (PAINAD)
BODYLANGUAGE: 0
CONSOLABILITY: 0
BREATHING: 0
TOTALSCORE: 0
NEGVOCALIZATION: 0
FACIALEXPRESSION: 0

## 2021-12-31 ASSESSMENT — PAIN SCALES - GENERAL
PAINLEVEL_OUTOF10: 0

## 2021-12-31 NOTE — PLAN OF CARE
Problem: Skin Integrity:  Goal: Absence of new skin breakdown  Description: Absence of new skin breakdown  Outcome: Ongoing  Note: Isaiah skin assessment completed overnight, pt scored a 15. No areas of new skin breakdown noted. Pt has remained incontinent of bowel/bladder thus far this shift. Perineal care provided after each incontinent episode. Will continue to monitor. Problem: Body Temperature -  Risk of, Imbalanced  Goal: Ability to maintain a body temperature within defined limits  Outcome: Ongoing  Note: Thus far this shift pt has remained afebrile. Will continue to monitor. Problem: Mental Status - Impaired:  Goal: Mental status will improve  Description: Mental status will improve  Outcome: Ongoing  Note: Pt has remained alert and oriented to self only with poor judgment, attention, and safety awareness. Avasys camera in place, bed alarm on. Will continue to monitor.

## 2021-12-31 NOTE — PLAN OF CARE
Problem: Discharge Planning:  Goal: Patients continuum of care needs are met  Description: Patients continuum of care needs are met  Outcome: Ongoing  Note:  will assist with discharge needs. Problem: Mental Status - Impaired:  Goal: Mental status will improve  Description: Mental status will improve  12/31/2021 0212 by Dori Franks RN  Outcome: Ongoing  Note: Pt has remained alert and oriented to self only with poor judgment, attention, and safety awareness. Avasys camera in place, bed alarm on. Will continue to monitor.

## 2021-12-31 NOTE — PROGRESS NOTES
Patient coughed intermittently while being fed and swallows frequently when given water. Notified Dr. Michelle Knapp via perfect serve. Stated will keep patient NPO with sips of water with meds.

## 2021-12-31 NOTE — PROGRESS NOTES
Progress Note    Admit Date: 12/30/2021  Day: 1  Diet: ADULT DIET; Regular; Low Sodium (2 gm)  ADULT ORAL NUTRITION SUPPLEMENT; Breakfast, Lunch, Dinner; Standard 4 oz Oral Supplement    CC: AMS/UTI    Interval history: NAOE. Only oriented to self. No other complaints at this time. No history could be obtained from the patient this morning. HPI: Landy Spence is a 80 y.o male who presented to the ED with confusion, generalized weakness and fatigue. PMH includes HTN and BPH. Per her wife patient started having increase memory issues starting 1 month ago. Prior to that was independent and was able to drive and do activities of daily living without any issues. Only had minor cognitive deficits. No concerns for Alzheimer's. Within the last month mentation has been declining. Wife had noticed that patient had been sleeping more during the day which is atypical for the patient. Does not snore during the day. Wife states that patient has not complained of any medical issues. She states that he has not complained of any abdominal pains or any dysuria, fevers, chills. On 12/27 was able to drive with no issues. Wife brought patient to the hospital on 12/30/2021 secondary to worsening cognitive deficits. On evaluation in the ED he was able to state his full name and denies any shortness of breath or chest pain but was unable to respond to anything else. Was admitted to the hospital for AMS likely secondary to a UTI.     On evaluation on 12/31 patient did say \" shoot may shoot me\" but did not further elaborate after that    Medications:     Scheduled Meds:   lisinopril  5 mg Oral Daily    tamsulosin  0.4 mg Oral Daily    sodium chloride flush  5-40 mL IntraVENous 2 times per day    enoxaparin  40 mg SubCUTAneous Daily    cefTRIAXone (ROCEPHIN) IV  1,000 mg IntraVENous Q24H     Continuous Infusions:   sodium chloride      sodium chloride       PRN Meds:sodium chloride flush, sodium chloride, ondansetron **OR** ondansetron, polyethylene glycol, acetaminophen **OR** acetaminophen    Objective:   Vitals:   T-max:  Patient Vitals for the past 8 hrs:   BP Temp Temp src Pulse Resp SpO2 Height   12/31/21 0930       6' (1.829 m)   12/31/21 0810 130/79 98 °F (36.7 °C) Oral 108 21 91 %    12/31/21 0304 123/73 98 °F (36.7 °C) Oral 95 20 93 %        Intake/Output Summary (Last 24 hours) at 12/31/2021 1028  Last data filed at 12/31/2021 0900  Gross per 24 hour   Intake 110 ml   Output 100 ml   Net 10 ml       Review of Systems   Unable to perform ROS: Mental status change       Physical Exam  Constitutional:       General: He is not in acute distress. Appearance: He is not ill-appearing or diaphoretic. Comments: malnourished   HENT:      Head: Normocephalic and atraumatic. Eyes:      Extraocular Movements: Extraocular movements intact. Pupils: Pupils are equal, round, and reactive to light. Cardiovascular:      Rate and Rhythm: Regular rhythm. Tachycardia present. Pulses: Normal pulses. Heart sounds: No murmur heard. Pulmonary:      Breath sounds: No wheezing or rales. Abdominal:      General: Abdomen is flat. Bowel sounds are normal.      Palpations: Abdomen is soft. Tenderness: There is no abdominal tenderness. There is no guarding. Musculoskeletal:         General: No swelling or tenderness. Right lower leg: No edema. Left lower leg: No edema. Skin:     Coloration: Skin is not jaundiced or pale. Neurological:      Mental Status: He is alert. He is disoriented.        LABS:    CBC:   Recent Labs     12/30/21 1906 12/31/21  0422   WBC 7.6 6.2   HGB 13.5 12.0*   HCT 40.4* 35.2*    176   MCV 91.9 90.0     Renal:    Recent Labs     12/30/21 1906 12/31/21  0421    138   K 3.9 3.6    103   CO2 25 26   BUN 20 16   CREATININE 0.6* <0.5*   GLUCOSE 97 103*   CALCIUM 8.8 8.1*   ANIONGAP 10 9     Hepatic:   Recent Labs     12/30/21 1906   AST 30   ALT 19 BILITOT 0.6   BILIDIR <0.2   PROT 7.2   LABALBU 4.4   ALKPHOS 93     Troponin:   Recent Labs     12/30/21  1906   TROPONINI <0.01     -----------------------------------------------------------------  RAD:   CT Head WO Contrast   Final Result      Cerebral atrophy. Evidence of significant diffuse chronic small vessel white matter disease bilaterally. No acute intracranial findings. XR CHEST PORTABLE   Final Result      No acute cardiopulmonary findings. Assessment/Plan:     Mark Burton is a 80 y.o. male, who has a PMH of: HTN/BPH    Acute metabolic encephalopathy likely 2/2 UTI:   -Consider other etiology given AMS has been on going for a month. Will treat UTI first.   - Continue IV ceftriaxone 5 days (12/30-1/3)   - PT/OT for weakness  - F/U hepatic, TSH, B12, B9  - Will likely benefit from neuropsych eval if no improvement on treatment of UTI     Malnourishement:   - BMI: 15 - patient reports poor appetite on admission  - continue current diet. No other rec changes from dietician     Chronic issues:   HTN - Continue home lisinopril   BPH - Continue home tamsulosin     Code Status: Limited  FEN: ADULT DIET; Regular; Low Sodium (2 gm)  ADULT ORAL NUTRITION SUPPLEMENT; Breakfast, Lunch, Dinner; Standard 4 oz Oral Supplement  PPX: Lovenox  DISPO: [facility]     Arielle Frye DO, PGY-1  12/31/21  10:28 AM    This patient has been staffed and discussed with Jer Kiser MD.     Addendum to Resident H& P/Progress note:  I have personally seen,examined and evaluated the patient.  I have reviewed the current history, physical findings, labs and assessment and plan and agree with note as documented by resident MD ( Ole Cornell)      Jer Kiser MD, [address]

## 2021-12-31 NOTE — H&P
Internal Medicine  PGY 1  History & Physical      CC generalized weakness    History Obtained From:  Patient; EMR     HISTORY OF PRESENT ILLNESS:    Mr. Tamera Morales is an 80 YOM   PMHx: HTN, BPH    Presents to the ED with confusion, generalized weakness and fatigue. Unable to have meaningful conversation with the patient as he continues to be confused. He was able to state his full name, deny SOB and chest pain, but was unable to respond to anything else. I spoke with his wife, Robina Olivares, over the phone and she related that the patient had been sleeping a lot today and was unable to get out of bed for dinner, which prompted her to call EMS. She notes that he has been getting tired more easily and has been rather unsteady on his feet for the past month as well. He does use a cane. She also notes that he has been increasing confused over the last month as well in the sense that he was unsure of how to respond or what to do at times. In the ED:   - Pro-BNP: 633 (N is 0-449)   - COVID PCR: PENDING  - UA: positive ketones, blood, protein, nitrites, mucus 1+, 11-20 RBCs, bacteria 2+   - CT head w/o contrast: no acute intracranial process   - CXR: no acute cardiopulmonary findings     Past Medical History:        Diagnosis Date    Bilateral pneumonia 11/2017    BPH (benign prostatic hyperplasia)        Past Surgical History:    History reviewed. No pertinent surgical history. Medications Priorto Admission:    Medications Prior to Admission: lisinopril (PRINIVIL;ZESTRIL) 5 MG tablet, TAKE 1 TABLET BY MOUTH EVERY DAY  tamsulosin (FLOMAX) 0.4 MG capsule, NEEDS APPOINTMENT - TAKE ONE TABLET BY MOUTH DAILY  Multiple Vitamin (MULTIVITAMIN PO), Take  by mouth. Allergies:  Patient has no known allergies. Social History:   · TOBACCO:   reports that he has never smoked. He has never used smokeless tobacco.  · ETOH:   reports no history of alcohol use.   · DRUGS : no use   · Patient currently lives with wife at home  ·   Family History:       Problem Relation Age of Onset    Breast Cancer Mother     Heart Failure Father        Review of Systems    ROS: as noted in HPI. Physical Exam  Constitutional:       Comments: BMI 15   HENT:      Head: Normocephalic and atraumatic. Eyes:      Extraocular Movements: Extraocular movements intact. Conjunctiva/sclera: Conjunctivae normal.   Cardiovascular:      Rate and Rhythm: Tachycardia present. Rhythm irregular. Pulmonary:      Breath sounds: Normal breath sounds. Abdominal:      General: Abdomen is flat. Palpations: Abdomen is soft. Musculoskeletal:      Right lower leg: No edema. Left lower leg: No edema. Neurological:      Mental Status: He is alert. He is disoriented. Physical exam:       Vitals:    12/30/21 2304   BP: 122/68   Pulse: 98   Resp: 20   Temp: 98.1 °F (36.7 °C)   SpO2: 93%       DATA:    Labs:  CBC:   Recent Labs     12/30/21 1906   WBC 7.6   HGB 13.5   HCT 40.4*          BMP:   Recent Labs     12/30/21 1906      K 3.9      CO2 25   BUN 20   CREATININE 0.6*   GLUCOSE 97     LFT's:   Recent Labs     12/30/21 1906   AST 30   ALT 19   BILITOT 0.6   ALKPHOS 93     Troponin:   Recent Labs     12/30/21 1906   TROPONINI <0.01     BNP:No results for input(s): BNP in the last 72 hours. ABGs: No results for input(s): PHART, HDM2ASE, PO2ART in the last 72 hours. INR: No results for input(s): INR in the last 72 hours. U/A:  Recent Labs     12/30/21  1849   COLORU Yellow   PHUR 6.5   WBCUA 3-5   RBCUA 11-20*   MUCUS 1+*   BACTERIA 2+*   CLARITYU Clear   SPECGRAV >=1.030   LEUKOCYTESUR Negative   UROBILINOGEN 0.2   BILIRUBINUR Negative   BLOODU LARGE*   GLUCOSEU Negative       CT Head WO Contrast   Final Result      Cerebral atrophy. Evidence of significant diffuse chronic small vessel white matter disease bilaterally. No acute intracranial findings.             XR CHEST PORTABLE   Final Result      No acute cardiopulmonary findings. ASSESSMENT AND PLAN:    Acute metabolic encephalopathy 2/2 UTI:   - presents with generalized weakness  - AMS/confusion - given small dose of haldol in ED (night of 12/30)   - CT head w/o contrast: no acute intracranial process   - CXR: no acute cardiopulmonary findings   - UA: positive for blood and nitrites  - IV ceftriaxone 5 days (12/30-1/3)   - PT/OT for weakness  - if confusion persists, consider dementia work-up and neuro consult    Malnourishement:   - BMI: 15  - patient reports poor appetite   - c/s dietitian     Chronic issues:   HTN - lisinopril   BPH - tamsulosin          Will discuss with attending physician Dr. Miguel Charles Status:Full code  FEN: Adult Regular   PPX: Lovenox   DISPO: IP    Prince Ryann MD  12/30/2021,  11:28 PM    Addendum to Resident H& P/Progress note:  I have personally seen,examined and evaluated the patient. I have reviewed the current history, physical findings, labs and assessment and plan and agree with note as documented by resident MD ( )  + moderate malnutrition.   The patient is underweight    Yusra Martínez MD, [address]

## 2021-12-31 NOTE — PLAN OF CARE
Problem: Nutrition  Goal: Optimal nutrition therapy  Outcome: Ongoing     Nutrition Problem #1: Inadequate oral intake  Intervention: Food and/or Nutrient Delivery: Continue Current Diet,Start Oral Nutrition Supplement  Nutritional Goals: Pt to consume >50% of meals and ONS through adm

## 2021-12-31 NOTE — PLAN OF CARE
Completed bedside swallow evaluation. Please see report in EMR.      Valentin Garrett M.A., [place]  Speech-Language Pathologist

## 2021-12-31 NOTE — PROGRESS NOTES
Speech Language Pathology  Facility/Department: [place] [address] [facility]   CLINICAL BEDSIDE SWALLOW EVALUATION    NAME: Mark Burton  : 1934  MRN: [id number]    ADMISSION DATE: 2021  ADMITTING DIAGNOSIS: has Injury of tendon of long head of right biceps; Dizziness; Rib pain on right side; Fall as cause of accidental injury in home as place of occurrence; Weakness generalized; Upper respiratory tract infection; Sepsis ([address] Sepsis due to pneumonia ([address] Volume depletion; SHAMEKA (acute kidney injury) ([address] Pneumonia of right lower lobe due to infectious organism; Acute metabolic encephalopathy; Urinary tract infection without hematuria; Essential hypertension; Moderate malnutrition ([address] and Underweight on their problem list.  ONSET DATE: 2021    Recent Chest Xray: 2021  Impression       No acute cardiopulmonary findings.         Date of Eval: 2021  Evaluating Therapist: UZIEL Freire    Current Diet level:  Current Diet : NPO  Current Liquid Diet : NPO    Primary Complaint: Unable to state     Pain:  Pain Assessment  Pain Assessment: 0-10  Pain Level: 0    Reason for Referral  Mark Burton was referred for a bedside swallow evaluation to assess the efficiency of his swallow function, identify signs and symptoms of aspiration and make recommendations regarding safe dietary consistencies, effective compensatory strategies, and safe eating environment. Impression  Dysphagia Diagnosis: Suspected needs further assessment;Severe pharyngeal stage dysphagia; Severe oral stage dysphagia  Dysphagia Impression : Pt is upright in bed, naked (nursing endorsing pt will not keep clothing / hospital equipment on due to AMS) and unable to answer orientation questions besides name with multiple repetition / cues to elicit. Pt noted to have twitching/tremor of laryngeal prominence and oral cavity and weak dysphonic vocal quality.  Pt very difficult to position due to stiffness throughout and pharyngeal neck Yes  Subjective  Subjective: Pt admitted to [facility] on 12/30/2021 from home via EMS due to confusion and generalized weakness and fatigue. Pt diagnosed with encephalopathy secondary to UTI. The patient has no pertinent past medical history for dysphagia on file. The patient does not have elevated WBC or neutrophil absolutes, no history of dysphagia or evaluation by SLP, tolerating room air and is afebrile. Behavior/Cognition: Alert;Pleasant mood; Cooperative  Temperature Spikes Noted: No  Respiratory Status: Room air  O2 Device: None (Room air)  Communication Observation: Functional (frequent absent responses)  Follows Directions: Simple (intermittent)  Dentition: Adequate  Patient Positioning: Upright in bed  Baseline Vocal Quality: Dysphonic;Weak  Volitional Cough: Weak  Prior Dysphagia History: None per chart review  Consistencies Administered: Thin - teaspoon; Ice Chips; Nectar - teaspoon;Dysphagia Pureed (Dysphagia I)    Vision/Hearing  Vision  Vision:  (not tracking fully visually)    Oral Motor Deficits  Oral/Motor  Oral Motor: Within functional limits (unable to follow commands to participate; xerostomia)    Oral Phase Dysfunction  Oral Phase  Oral Phase: Exceptions  Oral Phase Dysfunction  Pocketing Left: Thin - teaspoon;Nectar - teaspoon  Oral Phase  Oral Phase - Comment: Pt opens mouth and verbalizes agreement for PO but does not close mouth around spoon. With pressure to lingual surface plus max verbal cues mouth closure achieved and with what appears to be absent control of thin liquids. Anterior loss x2 with thins / nectar via tsp. Lingual pumping with nectars and 1/4 tsp puree. Indicators of Pharyngeal Phase Dysfunction   Pharyngeal Phase  Pharyngeal Phase: Exceptions  Indicators of Pharyngeal Phase Dysfunction  Delayed Swallow: Thin - teaspoon  Throat Clearing - Immediate: Puree  Cough - Immediate:  Thin - teaspoon (immediate and sustained/prolonged)  Pharyngeal Phase   Pharyngeal: Pt with what appeared to be immediate loss to pharynx with thins via tsp consistently with a laryngeal elevation with absent swallow and coughing prior to/during swallow initiation that was prolonged and substantial. The patient demo'd immediate throat clear with small amt of puree. All textures result in multiple swallows per bolus and weak/slightly dysphonic nose make bedside assessment difficult. Prognosis  Prognosis  Prognosis for safe diet advancement: guarded  Barriers to reach goals: age  Barriers/Prognosis Comment: unclear etiology of severe dysphagia  Individuals consulted  Consulted and agree with results and recommendations: Patient;RN    Education  Patient Education: Educated pt as to rationale for swallow eval and recommendations.   Patient Education Response: No evidence of learning  Safety Devices in place: Yes  Type of devices: Bed alarm in place;Call light within reach;Nurse notified (Luis Rivera in place)       Therapy Time  SLP Individual Minutes  Time In: 1430  Time Out: 9321  Minutes: 15  SLP Co-Treatment Minutes  Time In: 0000  Time Out: 0000  Minutes: 0  SLP Total Treatment Time  Timed Code Treatment Minutes: 0 Minutes  Total Treatment Time: ALFA Liriano Travisfort  Speech-Language Pathologist

## 2021-12-31 NOTE — PROGRESS NOTES
Pt's spouse Cameron Clarke called with an update on pt's current status and plan of care. All questions answered.  Electronically signed by Katlyn Florian RN on 12/31/2021 at 7:01 AM

## 2021-12-31 NOTE — PROGRESS NOTES
4 Eyes Admission Assessment     I agree as the admission nurse that 2 RN's have performed a thorough Head to Toe Skin Assessment on the patient. ALL assessment sites listed below have been assessed on admission. Areas assessed by both nurses:  [x]   Head, Face, and Ears   [x]   Shoulders, Back, and Chest  [x]   Arms, Elbows, and Hands   [x]   Coccyx, Sacrum, and Ischium  [x]   Legs, Feet, and Heels        Does the Patient have Skin Breakdown?   Pt with excoriation of buttocks          Isaiah Prevention initiated:  Yes   Wound Care Orders initiated:  No      WOC nurse consulted for Pressure Injury (Stage 3,4, Unstageable, DTI, NWPT, and Complex wounds) or Isaiah score 18 or lower:  No      Nurse 1 eSignature: Electronically signed by Raya Montesinos RN on 12/31/21 at 1:45 AM EST      Nurse 2 eSignature: Electronically signed by Jenn Costa RN on 12/31/21 at 7:06 AM EST

## 2021-12-31 NOTE — PROGRESS NOTES
Received a call from Petrona Duenas, pt's wife. Complete update was given. All questions were answered.

## 2021-12-31 NOTE — CONSULTS
Comprehensive Nutrition Assessment  RD did not conduct direct, in-person nutrition evaluation in efforts to reduce exposure and use of PPE for high risk persons, PUI persons, patients who have tested positive for Covid-19 or those awaiting respiratory panel results. EMR was screened for nutrition risk factors, as defined per nutrition standards of care. Nutrition history and assessment obtained through RD EMR review and RN report. RECOMMENDATIONS:  1. PO Diet: Continue regular, low Na diet  2. ONS: Start Ensure Compact TID    NUTRITION ASSESSMENT:   Type and Reason for Visit:  Type and Reason for Visit: Initial,Consult   Nutritional summary & status: RD consulted for poor intake/appetite >5d PTA. RD did not visit w/ pt d/t COVID precautions and to preserve PPE. Pt w/ limited hx available d/t AMS. Wife reports pt missing meals and very fatigued over last 7 days. Wife states pt has been increasingly more confused over last month. Called RN for further assessment however RN has not spoken w/ wife and d/t pt's poor mentation, unable to provide further information at this time. Pt w/ poor judgement per EMR, currently being monitored using [employer] camera. No intakes recorded in EMR. Pt to benefit from ONS to promote intake. Will order.      Patient admitted d/t AMS, generalized weakness    PMH significant for: limited hx available d/t AMS    MALNUTRITION ASSESSMENT  Context of Malnutrition: Acute Illness   Malnutrition Status: Mild malnutrition - likely more severe but OTIS d/t COVID precautions  Findings of the 6 clinical characteristics of malnutrition (Minimum of 2 out of 6 clinical characteristics is required to make the diagnosis of moderate or severe Protein Calorie Malnutrition based on AND/ASPEN Guidelines):  Energy Intake: Less than/equal to 50% of estimated energy requirements    Energy Intake Time: Greater than or equal to 5 days    Weight Loss %: Unable to assess    Weight loss Time: Unable to assess   Body Fat Loss: Unable to Assess   Body Fat Location: Unable to assess    Body Muscle Loss: Unable to Assess   Body Muscle Loss Location: Unable to assess    Fluid Accumulation: Unable to assess    Fluid Accumulation Location: Unable to assess     Strength: Not Performed; Not Measured     NUTRITION DIAGNOSIS   · Inadequate oral intake related to cognitive or neurological impairment (AMS) as evidenced by poor intake prior to [address] and/or Nutrient Delivery:  Continue Current Diet,Start Oral Nutrition Supplement  Nutrition Education/Counseling:  No recommendation at this time   Goals:  Pt to consume >50% of meals and ONS through adm       Nutrition Monitoring and Evaluation:   Food/Nutrient Intake Outcomes:  Food and Nutrient Intake,Supplement Intake  Physical Signs/Symptoms Outcomes:  Biochemical Data,Weight     OBJECTIVE DATA: Significant to nutrition assessment  · Nutrition-Focused Physical Findings: Nutrition Related Findings: lbm 12/31   · Labs: Reviewed;   · Meds: Reviewed; zofran PRN; glycolax PRN  · Wounds: Wound Type: None     CURRENT NUTRITION THERAPIES  ADULT DIET; Regular;  Low Sodium (2 gm)     Additional Calorie Sources:   none  PO Intake: Average Meal Intake: Unable to assess (none recorded)   PO Supplement Intake:Average Supplements Intake: None Ordered  IVF: n/a     ANTHROPOMETRICS  Current Height: 6' (182.9 cm)  Current Weight: 116 lb 2.9 oz (52.7 kg)    Admission weight: 116 lb 2.9 oz (52.7 kg)  Ideal Body Weight (lbs) (Calculated): 178 lbs (Ideal Body Weight (Kg) (Calculated): 81 kg)  Usual Bodyweight: per -120#  Weight Changes: none noted per limited wt hx       BMI BMI (Calculated): 15.8    Wt Readings from Last 50 Encounters:   12/31/21 116 lb 2.9 oz (52.7 kg)   09/20/21 115 lb (52.2 kg)   09/13/21 115 lb (52.2 kg)   08/30/21 117 lb (53.1 kg)       COMPARATIVE STANDARDS  Energy (kcal):  3392-1816; Weight Used for Energy Requirements:  Admission (52.7)     Protein (g):  78-93; Weight Used for Protein Requirements:  Admission        Fluid (ml/day):  >1500ml; Method Used for Fluid Requirements:  ml/Kg      The patient will still be monitored per nutrition standards of care. Consult dietitian if nutrition interventions essential to patient care is needed.      Jennifer Gunn [address], [address]  Deo:  [phone]  Office:  [phone]

## 2021-12-31 NOTE — PROGRESS NOTES
Pt's spouse Tamela Bazzi updated on pt's current status and plan of care. All questions answered.  Electronically signed by Chanda Dutta RN on 12/31/2021 at 1:44 AM

## 2022-01-01 LAB
ANION GAP SERPL CALCULATED.3IONS-SCNC: 5 MMOL/L (ref 3–16)
BUN BLDV-MCNC: 19 MG/DL (ref 7–20)
CALCIUM SERPL-MCNC: 8.1 MG/DL (ref 8.3–10.6)
CHLORIDE BLD-SCNC: 103 MMOL/L (ref 99–110)
CO2: 28 MMOL/L (ref 21–32)
CREAT SERPL-MCNC: <0.5 MG/DL (ref 0.8–1.3)
GFR AFRICAN AMERICAN: >60
GFR NON-AFRICAN AMERICAN: >60
GLUCOSE BLD-MCNC: 100 MG/DL (ref 70–99)
HCT VFR BLD CALC: 38.1 % (ref 40.5–52.5)
HEMOGLOBIN: 12.8 G/DL (ref 13.5–17.5)
MAGNESIUM: 1.7 MG/DL (ref 1.8–2.4)
MCH RBC QN AUTO: 30.3 PG (ref 26–34)
MCHC RBC AUTO-ENTMCNC: 33.4 G/DL (ref 31–36)
MCV RBC AUTO: 90.7 FL (ref 80–100)
PDW BLD-RTO: 13.3 % (ref 12.4–15.4)
PLATELET # BLD: 163 K/UL (ref 135–450)
PMV BLD AUTO: 8.8 FL (ref 5–10.5)
POTASSIUM REFLEX MAGNESIUM: 3.5 MMOL/L (ref 3.5–5.1)
RBC # BLD: 4.2 M/UL (ref 4.2–5.9)
SODIUM BLD-SCNC: 136 MMOL/L (ref 136–145)
WBC # BLD: 7.3 K/UL (ref 4–11)

## 2022-01-01 PROCEDURE — 2060000000 HC ICU INTERMEDIATE R&B

## 2022-01-01 PROCEDURE — 80048 BASIC METABOLIC PNL TOTAL CA: CPT

## 2022-01-01 PROCEDURE — 99232 SBSQ HOSP IP/OBS MODERATE 35: CPT | Performed by: HOSPITALIST

## 2022-01-01 PROCEDURE — 36415 COLL VENOUS BLD VENIPUNCTURE: CPT

## 2022-01-01 PROCEDURE — 92526 ORAL FUNCTION THERAPY: CPT

## 2022-01-01 PROCEDURE — 83735 ASSAY OF MAGNESIUM: CPT

## 2022-01-01 PROCEDURE — 2580000003 HC RX 258: Performed by: STUDENT IN AN ORGANIZED HEALTH CARE EDUCATION/TRAINING PROGRAM

## 2022-01-01 PROCEDURE — 6360000002 HC RX W HCPCS: Performed by: STUDENT IN AN ORGANIZED HEALTH CARE EDUCATION/TRAINING PROGRAM

## 2022-01-01 PROCEDURE — 97530 THERAPEUTIC ACTIVITIES: CPT

## 2022-01-01 PROCEDURE — 97162 PT EVAL MOD COMPLEX 30 MIN: CPT

## 2022-01-01 PROCEDURE — 2500000003 HC RX 250 WO HCPCS: Performed by: STUDENT IN AN ORGANIZED HEALTH CARE EDUCATION/TRAINING PROGRAM

## 2022-01-01 PROCEDURE — 85027 COMPLETE CBC AUTOMATED: CPT

## 2022-01-01 PROCEDURE — 51798 US URINE CAPACITY MEASURE: CPT

## 2022-01-01 RX ORDER — LANOLIN ALCOHOL/MO/W.PET/CERES
100 CREAM (GRAM) TOPICAL DAILY
Status: DISCONTINUED | OUTPATIENT
Start: 2022-01-01 | End: 2022-01-01

## 2022-01-01 RX ORDER — DEXTROSE AND SODIUM CHLORIDE 5; .45 G/100ML; G/100ML
INJECTION, SOLUTION INTRAVENOUS CONTINUOUS
Status: ACTIVE | OUTPATIENT
Start: 2022-01-01 | End: 2022-01-02

## 2022-01-01 RX ORDER — DILTIAZEM HYDROCHLORIDE 5 MG/ML
10 INJECTION INTRAVENOUS ONCE
Status: COMPLETED | OUTPATIENT
Start: 2022-01-01 | End: 2022-01-01

## 2022-01-01 RX ORDER — THIAMINE HYDROCHLORIDE 100 MG/ML
100 INJECTION, SOLUTION INTRAMUSCULAR; INTRAVENOUS DAILY
Status: DISCONTINUED | OUTPATIENT
Start: 2022-01-01 | End: 2022-01-01

## 2022-01-01 RX ORDER — 0.9 % SODIUM CHLORIDE 0.9 %
500 INTRAVENOUS SOLUTION INTRAVENOUS ONCE
Status: COMPLETED | OUTPATIENT
Start: 2022-01-01 | End: 2022-01-01

## 2022-01-01 RX ORDER — SODIUM CHLORIDE, SODIUM LACTATE, POTASSIUM CHLORIDE, AND CALCIUM CHLORIDE .6; .31; .03; .02 G/100ML; G/100ML; G/100ML; G/100ML
500 INJECTION, SOLUTION INTRAVENOUS ONCE
Status: DISCONTINUED | OUTPATIENT
Start: 2022-01-01 | End: 2022-01-01

## 2022-01-01 RX ORDER — LABETALOL HYDROCHLORIDE 5 MG/ML
5 INJECTION, SOLUTION INTRAVENOUS EVERY 4 HOURS PRN
Status: DISCONTINUED | OUTPATIENT
Start: 2022-01-01 | End: 2022-01-05 | Stop reason: HOSPADM

## 2022-01-01 RX ORDER — MAGNESIUM SULFATE IN WATER 40 MG/ML
2000 INJECTION, SOLUTION INTRAVENOUS ONCE
Status: COMPLETED | OUTPATIENT
Start: 2022-01-01 | End: 2022-01-01

## 2022-01-01 RX ORDER — POTASSIUM CHLORIDE 7.45 MG/ML
10 INJECTION INTRAVENOUS
Status: DISPENSED | OUTPATIENT
Start: 2022-01-01 | End: 2022-01-01

## 2022-01-01 RX ADMIN — DILTIAZEM HYDROCHLORIDE 10 MG: 5 INJECTION INTRAVENOUS at 11:53

## 2022-01-01 RX ADMIN — DILTIAZEM HYDROCHLORIDE 5 MG/HR: 5 INJECTION INTRAVENOUS at 11:57

## 2022-01-01 RX ADMIN — SODIUM CHLORIDE, PRESERVATIVE FREE 10 ML: 5 INJECTION INTRAVENOUS at 10:02

## 2022-01-01 RX ADMIN — MAGNESIUM SULFATE HEPTAHYDRATE 2000 MG: 2 INJECTION, SOLUTION INTRAVENOUS at 11:43

## 2022-01-01 RX ADMIN — SODIUM CHLORIDE, PRESERVATIVE FREE 10 ML: 5 INJECTION INTRAVENOUS at 21:15

## 2022-01-01 RX ADMIN — THIAMINE HYDROCHLORIDE 100 MG: 100 INJECTION, SOLUTION INTRAMUSCULAR; INTRAVENOUS at 11:06

## 2022-01-01 RX ADMIN — SODIUM CHLORIDE 500 ML: 9 INJECTION, SOLUTION INTRAVENOUS at 13:44

## 2022-01-01 RX ADMIN — ENOXAPARIN SODIUM 40 MG: 100 INJECTION SUBCUTANEOUS at 10:02

## 2022-01-01 RX ADMIN — DEXTROSE AND SODIUM CHLORIDE: 5; 450 INJECTION, SOLUTION INTRAVENOUS at 09:57

## 2022-01-01 RX ADMIN — CEFTRIAXONE 1000 MG: 1 INJECTION, POWDER, FOR SOLUTION INTRAMUSCULAR; INTRAVENOUS at 21:17

## 2022-01-01 ASSESSMENT — PAIN SCALES - PAIN ASSESSMENT IN ADVANCED DEMENTIA (PAINAD)
CONSOLABILITY: 0
FACIALEXPRESSION: 0
CONSOLABILITY: 0
FACIALEXPRESSION: 0
BODYLANGUAGE: 0
CONSOLABILITY: 0
BREATHING: 0
BREATHING: 0
NEGVOCALIZATION: 0
TOTALSCORE: 0
BODYLANGUAGE: 0
TOTALSCORE: 0
FACIALEXPRESSION: 0
CONSOLABILITY: 0
FACIALEXPRESSION: 0
NEGVOCALIZATION: 0
NEGVOCALIZATION: 0
TOTALSCORE: 0
BREATHING: 0
BODYLANGUAGE: 0
NEGVOCALIZATION: 0
TOTALSCORE: 0
BODYLANGUAGE: 0
BREATHING: 0
NEGVOCALIZATION: 0
BREATHING: 0
BODYLANGUAGE: 0
CONSOLABILITY: 0
TOTALSCORE: 0
FACIALEXPRESSION: 0
CONSOLABILITY: 0
NEGVOCALIZATION: 0
FACIALEXPRESSION: 0
CONSOLABILITY: 0
BODYLANGUAGE: 0
TOTALSCORE: 0
BODYLANGUAGE: 0
TOTALSCORE: 0
BREATHING: 0
BREATHING: 0
NEGVOCALIZATION: 0
FACIALEXPRESSION: 0

## 2022-01-01 ASSESSMENT — PAIN SCALES - GENERAL
PAINLEVEL_OUTOF10: 0

## 2022-01-01 NOTE — PLAN OF CARE
Problem: Falls - Risk of:  Goal: Will remain free from falls  Description: Will remain free from falls  12/31/2021 2112 by Dana Villar RN  Outcome: Ongoing  12/31/2021 0932 by Art Miranda, ALFONSO  Note: Bed alarm is on. Standard fall precautions are in place. Call light and frequently used items are within pt's reach. MIGUEL/SYS monitor is in use. Frequent checks are made. Reminded patient to call with needs.       Problem: Falls - Risk of:  Goal: Absence of physical injury  Description: Absence of physical injury  Outcome: Ongoing     Problem: Safety:  Goal: Free from accidental physical injury  Description: Free from accidental physical injury  Outcome: Ongoing     Problem: Daily Care:  Goal: Daily care needs are met  Description: Daily care needs are met  Outcome: Ongoing

## 2022-01-01 NOTE — PROGRESS NOTES
Physical Therapy    Facility/Department: [place] [address] [facility]  Initial Assessment and Treatment    NAME: Jake Burton  : 1934  MRN: [id number]    Date of Service: 2022    Discharge Recommendations:    Mark Burton scored a   on the AM-PAC short mobility form. Current research shows that an AM-PAC score of 17 or less is typically not associated with a discharge to the patient's home setting. Based on the patient's AM-PAC score and their current functional mobility deficits, it is recommended that the patient have 3-5 sessions per week of Physical Therapy at d/c to increase the patient's independence. Please see assessment section for further patient specific details. If patient discharges prior to next session this note will serve as a discharge summary. Please see below for the latest assessment towards goals. PT Equipment Recommendations  Equipment Needed: No    Assessment   Body structures, Functions, Activity limitations: Decreased functional mobility ; Decreased balance;Decreased cognition  Assessment: Pt is 81 yo M with decreased mobility, sitting balance and cognition. Pt requires heavy assist for bed mobility, even though pt is able to initiate the movement. Pt with difficulty sitting EOB due to heavy lean to the L, requiring max assist.  From chart, it appears that pt is functioning much below his baseline as pt was living at home with his wife. Rec continued inpt PT at d/c prior to return home. Will follow. Treatment Diagnosis: Decreased functional mobility  Prognosis: Fair  Decision Making: Medium Complexity  PT Education: PT Role;General Safety;Orientation  Patient Education: Pt will need re-education  REQUIRES PT FOLLOW UP: Yes  Activity Tolerance  Activity Tolerance: Patient limited by fatigue;Patient limited by cognitive status       Patient Diagnosis(es): The primary encounter diagnosis was Encephalopathy.  A diagnosis of Urinary tract infection without hematuria, site unspecified was also pertinent to this visit. has a past medical history of Bilateral pneumonia and BPH (benign prostatic hyperplasia). has no past surgical history on file. Restrictions  Position Activity Restriction  Other position/activity restrictions: Up with assist     Vision/Hearing  Vision:  (Pt tends to gaze up toward ceiling)  Hearing:  (Unable to assess)       Subjective  General  Chart Reviewed: Yes  Additional Pertinent Hx: Pt admitted on 12/30/21 with encephalopathy. Pt found to be COVID +. CT head: (-) acute. H/o B PNA, BPH. Family / Caregiver Present: No  Referring Practitioner: Dr. Cyndi Pang  Diagnosis: Encephalopathy  Subjective  Subjective: Pt supine in bed and agreeable to PT. Pt mumbling and difficult to understand. Pt inconsistently follows simple commands. Pain Screening  Patient Currently in Pain: No    Orientation  Orientation  Overall Orientation Status: Impaired (Unable to fully assess, not following simple commands consistently)     Social/Functional History  Social/Functional History  Lives With: Spouse  Additional Comments: Unable to gather social situation from pt due to confusion and difficulty with his speech. Per chart, pt has had a decline in the past month.     Objective  Observation/Palpation  Observation: Pt with labored breathing on RA, O2 sats 96%    PROM RLE (degrees)  RLE PROM: WFL  PROM LLE (degrees)  LLE PROM: WFL     Strength RLE  Comment: >3/5  Strength LLE  Comment: >3/5     Bed mobility  Supine to Sit: Maximum assistance  Scooting: Maximal assistance  Comment: Pt initiated by scooting legs toward side of bed     Balance  Sitting - Static: Poor  Comments: Pt sits EOB x 5 mins with max assist due to L lean and pushing into L side     Pt positioned on L side using pillows in bed    Treatment:  Functional mobility training and pt education    Plan   Plan  Times per week: 2-5  Current Treatment Recommendations: Functional Mobility Training,Balance Training,Gait

## 2022-01-01 NOTE — PROGRESS NOTES
Speech Language Pathology  Facility/Department: [place] [address] [facility]  Dysphagia Daily Treatment Note    NAME: Celina Quintanilla [facility]  : 1934  MRN: [id number]    Patient Diagnosis(es):   Patient Active Problem List    Diagnosis Date Noted    Urinary tract infection without hematuria     Essential hypertension     Moderate malnutrition ([address]     Underweight     Acute metabolic encephalopathy     Pneumonia of right lower lobe due to infectious organism     Sepsis ([address] 11/15/2017    Sepsis due to pneumonia ([facility] 11/15/2017    Volume depletion     SHAMEKA (acute kidney injury) ([facility]     Upper respiratory tract infection 2017    Weakness generalized 02/10/2017    Dizziness 10/04/2016    Rib pain on right side 10/04/2016    Fall as cause of accidental injury in home as place of occurrence 10/04/2016    Injury of tendon of long head of right biceps 2016     Allergies: No Known Allergies      Recent Chest Xray: 2021  Impression       No acute cardiopulmonary findings.         CT head 21  Impression       Cerebral atrophy.       Evidence of significant diffuse chronic small vessel white matter disease bilaterally.       No acute intracranial findings. Chart reviewed. Medical Diagnosis: Encephalopathy [G93.40]  Urinary tract infection without hematuria, site unspecified [V57.9]  Acute metabolic encephalopathy [W73.98]   Treatment Diagnosis:dysphagia     BSE Impression 21-  Dysphagia Impression : Pt is upright in bed, naked (nursing endorsing pt will not keep clothing / hospital equipment on due to AMS) and unable to answer orientation questions besides name with multiple repetition / cues to elicit. Pt is unable to particpiate in oral mech but no overt asymmetry is noted and does open mouth to command which reveals xerostomia. Pt opens mouth and verbalizes agreement for PO but does not close mouth around spoon.  With pressure to lingual surface plus max verbal cues mouth closure achieved and with what appears to be absent control of thin liquids. Anterior loss x2 with thins / nectar via tsp. Lingual pumping with nectars and 1/4 tsp puree. Pt with what appeared to be immediate loss to pharynx with thins via tsp consistently with a laryngeal elevation with absent swallow and coughing prior to/during swallow initiation that was prolonged and substantial. The patient demo'd immediate throat clear with small amount of puree. All textures result in multiple swallows per bolus and weak/slightly dysphonic nose make bedside assessment difficult. Pt can not provide explanation of sensations but did state \"it's biting me\" when experienced difficulty with swallow. Recommend ice chips only to stimulate swallow reflex and protect oral mucosa. NPO / meds via alternative means / oral care via suction toothette system. Dysphagia Diagnosis: Suspected needs further assessment,Severe pharyngeal stage dysphagia,Severe oral stage dysphagia    MBS results - not indicated at this time     Pain: none expressed via any means     Current Diet : Diet NPO Exceptions are: Sips of Water with Meds   Recommended Form of Meds: Via alternative means of nutrition  Compensatory Swallowing Strategies:  (staff assist for ice chips following oral care)     Treatment:  Pt seen bedside to address the following goals:  Short-term Goals  Goal 1: The patient will tolerate repeat BSE when able. 1/1- pt alert, confused, but likely less confused as compared to yesterday. Pt was able to follow some commands and responded appropriately to a few questions. Pt had difficulty with closing lips around spoon for all PO trials. With ice chips, pt making a disorganized chewing motion with mouth, but no evidence of teeth masticating ice chip. An eventual multiple swallow response noted, but unable to assess vocal quality following swallow as pt did not respond to question following the swallow.  With applesauce, pt with extremely prolonged mastication with multiple swallows noted, which could be indicative of pharyngeal residue, placing pt at risk for aspiration after the swallow. With trials of water by tsp- pt with poor lip closure and suspected premature loss to the pharynx. With first  2 sips of water by straw, pt was able to draw water up straw with swallow motion noted, but pt then swallowed multiple times. When attempted additional sips of water by straw, pt then blew into the straw. Given pt's degree of confusion, impaired mastication and multiple swallows with all trials, pt is not safe for PO intake. Recommend con''t NPO with oral care with suction and small amounts of ice chips to encourage swallow function. con't goal           Patient/Family/Caregiver Education:  .pt educated to the purpose of the visit and rational for NPO recommendation. Pt with no evidence of comprehension. Compensatory Strategies:  Compensatory Swallowing Strategies:  (staff assist for ice chips following oral care)      Plan:  Continued daily Dysphagia treatment with goals per  plan of care. Diet recommendations: con't NPO with oral care with suction with small amounts of ice chips    Recommend increase frequency of oral care to reduce risk of aspiration PNA. As pt is unable to expectorate at this time, adequate oral care would consist of cleaning entire oral cavity with suction kit 3x/day. DC recommendation: TBD closer to discharge   Treatment: 15  D/W nursing, Lum Raghavendra  Needs met prior to leaving room, call button in reach.     Asia Carrillo [address]  Speech-Language Pathologist  Pager [phone]      If patient is discharged prior to next treatment, this note will serve as the discharge summary

## 2022-01-01 NOTE — PROGRESS NOTES
Spoke with wife Shirley Yates. Updated and answered all questions at this time.   Electronically signed by Miriam Dudley RN on 1/1/2022 at 5:27 PM

## 2022-01-01 NOTE — PLAN OF CARE
Problem: Falls - Risk of:  Goal: Will remain free from falls  Description: Will remain free from falls  Outcome: Ongoing  Note: Pt free from injury or falls at this time, fall precautions in place, bed in low position, side rail up x2, [place] Fall Risk: High, bed alarm on, reoriented to room and call light, reminded not to get up without assistance. Pt verbalizes understanding of fall risk procedures. Will continue with hourly rounds for PO intake, pain needs, toileting, and repositioning as needed. No needs expressed at this time. Call light in reach, will continue to monitor. Problem: Cardiac:  Goal: Hemodynamic stability will improve  Description: Hemodynamic stability will improve  Outcome: Ongoing  Note: Pt remains hemodynamically stable at this time. VSS. . Pt receiving Cardizem gtt at 5mg/hr. Strict I/Os maintained with daily weights. AFIB  on tele. SpO2 remains >92% on room air . Will continue to monitor.

## 2022-01-01 NOTE — PROGRESS NOTES
Progress Note    Admit Date: 12/30/2021  Diet: Diet NPO Exceptions are: Sips of Water with Meds    CC: AMS    Interval history: COVID positive  ST and AFIB. Patient appears dry, will give fluids. Replaced lytes. Start Dilt gtt for afib. Patient is awake but speech is incomprehensible. Does not appear to be in distress. Medications:     Scheduled Meds:   magnesium sulfate  2,000 mg IntraVENous Once    sodium chloride  500 mL IntraVENous Once    thiamine (VITAMIN B1) IVPB  100 mg IntraVENous Q24H    dilTIAZem  10 mg IntraVENous Once    [Held by provider] lisinopril  5 mg Oral Daily    [Held by provider] tamsulosin  0.4 mg Oral Daily    sodium chloride flush  5-40 mL IntraVENous 2 times per day    enoxaparin  40 mg SubCUTAneous Daily    cefTRIAXone (ROCEPHIN) IV  1,000 mg IntraVENous Q24H     Continuous Infusions:   dextrose 5 % and 0.45 % NaCl 100 mL/hr at 01/01/22 0957    dilTIAZem      sodium chloride       PRN Meds:labetalol, sodium chloride flush, sodium chloride, ondansetron **OR** ondansetron, polyethylene glycol, acetaminophen **OR** acetaminophen    Objective:   Vitals:   T-max:  Patient Vitals for the past 8 hrs:   BP Temp Temp src Pulse Resp SpO2   01/01/22 0820 (!) 146/82 98.1 °F (36.7 °C) Axillary 94 20 93 %   01/01/22 0422 (!) 172/99 99.1 °F (37.3 °C) Axillary 108 20 93 %       Intake/Output Summary (Last 24 hours) at 1/1/2022 1057  Last data filed at 1/1/2022 0820  Gross per 24 hour   Intake 30 ml   Output    Net 30 ml     Review of Systems   Unable to perform ROS: Mental status change       Physical Exam  Constitutional:       General: He is not in acute distress. Appearance: He is ill-appearing. He is not toxic-appearing. Comments: thin   HENT:      Head: Normocephalic. Right Ear: External ear normal.      Left Ear: External ear normal.      Nose: Nose normal.      Mouth/Throat:      Mouth: Mucous membranes are dry. Pharynx: Oropharynx is clear.  No oropharyngeal exudate or posterior oropharyngeal erythema. Eyes:      General:         Right eye: No discharge. Left eye: No discharge. Extraocular Movements: Extraocular movements intact. Conjunctiva/sclera: Conjunctivae normal.   Cardiovascular:      Rate and Rhythm: Tachycardia present. Rhythm irregular. Pulses: Normal pulses. Heart sounds: No murmur heard. Pulmonary:      Effort: Pulmonary effort is normal. No respiratory distress. Breath sounds: No wheezing or rales. Abdominal:      General: There is no distension. Palpations: Abdomen is soft. Tenderness: There is no abdominal tenderness. There is no guarding. Musculoskeletal:         General: No swelling or tenderness. Normal range of motion. Cervical back: Normal range of motion. No rigidity. Skin:     General: Skin is warm. Coloration: Skin is not jaundiced or pale. Neurological:      General: No focal deficit present. Mental Status: He is alert. Comments: Disoriented  Speech is difficult to comprehend           LABS:    CBC:   Recent Labs     12/30/21 1906 12/31/21 0422 01/01/22  0501   WBC 7.6 6.2 7.3   HGB 13.5 12.0* 12.8*   HCT 40.4* 35.2* 38.1*    176 163   MCV 91.9 90.0 90.7     Renal:    Recent Labs     12/30/21 1906 12/31/21 0421 01/01/22  0501    138 136   K 3.9 3.6 3.5    103 103   CO2 25 26 28   BUN 20 16 19   CREATININE 0.6* <0.5* <0.5*   GLUCOSE 97 103* 100*   CALCIUM 8.8 8.1* 8.1*   MG  --   --  1.70*   ANIONGAP 10 9 5     Hepatic:   Recent Labs     12/30/21 1906 12/31/21  0441   AST 30 26   ALT 19 14   BILITOT 0.6 0.4   BILIDIR <0.2 <0.2   PROT 7.2 5.8*   LABALBU 4.4 3.2*   ALKPHOS 93 68     Troponin:   Recent Labs     12/30/21 1906   TROPONINI <0.01     -----------------------------------------------------------------  RAD:   CT Head WO Contrast   Final Result      Cerebral atrophy.       Evidence of significant diffuse chronic small vessel white matter disease bilaterally. No acute intracranial findings. XR CHEST PORTABLE   Final Result      No acute cardiopulmonary findings. Assessment/Plan:     Acute metabolic encephalopathy likely 2/2 UTI  -Consider other etiology given AMS has been on going for a month. Will treat UTI first.   - Continue IV ceftriaxone 5 days (12/30-1/3)   - CT with cerebral atrophy, may need MRI if no improvement.   - PT/OT for weakness  - Will likely benefit from neuropsych eval if no improvement on treatment of UTI    Paroxsymal AFIB with RVR  - replace lytes  - give fluid bolus  - start dilt drip  - ECHO when out of isolation    COVID 19 infection  - does not show signs of pneumonia at this time  - does not meet criteria for steroids, immune suppressants  - if worsens start steroids and check inflammatory markers  - will try to get monoclonal antibody. Malnourishment  - significant dysphagia, NPO  - give glucose in fluids  - give thiamine  - given another bolus  - dietary following  - SLP eval    Chronic issues:   HTN - Continue home lisinopril - held while NPO  BPH - Continue home tamsulosin - held while NPO    Code Status: limited  FEN: NPO  PPX: lovenox  DISPO: needs improvement in clinical condition, mental status still an issue    Cassie Zepeda MD, PGY-3  01/01/22  10:57 AM    This patient has will be staffed and discussed with Yanni Olguin MD.     Addendum to Resident H& P/Progress note:  I have personally seen,examined and evaluated the patient.  I have reviewed the current history, physical findings, labs and assessment and plan and agree with note as documented by resident MD ( )  Mg- 1.7; will replace    Yanni Olguin MD, [facility]

## 2022-01-02 LAB
ALBUMIN SERPL-MCNC: 3.3 G/DL (ref 3.4–5)
ANION GAP SERPL CALCULATED.3IONS-SCNC: 10 MMOL/L (ref 3–16)
BUN BLDV-MCNC: 16 MG/DL (ref 7–20)
CALCIUM SERPL-MCNC: 7.9 MG/DL (ref 8.3–10.6)
CHLORIDE BLD-SCNC: 99 MMOL/L (ref 99–110)
CO2: 25 MMOL/L (ref 21–32)
CREAT SERPL-MCNC: <0.5 MG/DL (ref 0.8–1.3)
GFR AFRICAN AMERICAN: >60
GFR NON-AFRICAN AMERICAN: >60
GLUCOSE BLD-MCNC: 131 MG/DL (ref 70–99)
HCT VFR BLD CALC: 41.6 % (ref 40.5–52.5)
HEMOGLOBIN: 13.9 G/DL (ref 13.5–17.5)
MAGNESIUM: 2.1 MG/DL (ref 1.8–2.4)
MCH RBC QN AUTO: 30.5 PG (ref 26–34)
MCHC RBC AUTO-ENTMCNC: 33.4 G/DL (ref 31–36)
MCV RBC AUTO: 91.2 FL (ref 80–100)
PDW BLD-RTO: 13.5 % (ref 12.4–15.4)
PHOSPHORUS: 2.3 MG/DL (ref 2.5–4.9)
PLATELET # BLD: 171 K/UL (ref 135–450)
PMV BLD AUTO: 8.9 FL (ref 5–10.5)
POTASSIUM SERPL-SCNC: 3.5 MMOL/L (ref 3.5–5.1)
RBC # BLD: 4.56 M/UL (ref 4.2–5.9)
SODIUM BLD-SCNC: 134 MMOL/L (ref 136–145)
WBC # BLD: 8.3 K/UL (ref 4–11)

## 2022-01-02 PROCEDURE — 2580000003 HC RX 258: Performed by: STUDENT IN AN ORGANIZED HEALTH CARE EDUCATION/TRAINING PROGRAM

## 2022-01-02 PROCEDURE — 83735 ASSAY OF MAGNESIUM: CPT

## 2022-01-02 PROCEDURE — 6360000002 HC RX W HCPCS: Performed by: STUDENT IN AN ORGANIZED HEALTH CARE EDUCATION/TRAINING PROGRAM

## 2022-01-02 PROCEDURE — 2060000000 HC ICU INTERMEDIATE R&B

## 2022-01-02 PROCEDURE — 85027 COMPLETE CBC AUTOMATED: CPT

## 2022-01-02 PROCEDURE — 97167 OT EVAL HIGH COMPLEX 60 MIN: CPT

## 2022-01-02 PROCEDURE — 97535 SELF CARE MNGMENT TRAINING: CPT

## 2022-01-02 PROCEDURE — 36415 COLL VENOUS BLD VENIPUNCTURE: CPT

## 2022-01-02 PROCEDURE — 2500000003 HC RX 250 WO HCPCS: Performed by: STUDENT IN AN ORGANIZED HEALTH CARE EDUCATION/TRAINING PROGRAM

## 2022-01-02 PROCEDURE — 99232 SBSQ HOSP IP/OBS MODERATE 35: CPT | Performed by: HOSPITALIST

## 2022-01-02 PROCEDURE — 80069 RENAL FUNCTION PANEL: CPT

## 2022-01-02 PROCEDURE — 92526 ORAL FUNCTION THERAPY: CPT

## 2022-01-02 RX ORDER — SODIUM CHLORIDE 9 MG/ML
INJECTION, SOLUTION INTRAVENOUS CONTINUOUS
Status: ACTIVE | OUTPATIENT
Start: 2022-01-02 | End: 2022-01-03

## 2022-01-02 RX ORDER — DEXTROSE AND SODIUM CHLORIDE 5; .45 G/100ML; G/100ML
INJECTION, SOLUTION INTRAVENOUS CONTINUOUS
Status: ACTIVE | OUTPATIENT
Start: 2022-01-03 | End: 2022-01-03

## 2022-01-02 RX ADMIN — ENOXAPARIN SODIUM 40 MG: 100 INJECTION SUBCUTANEOUS at 08:22

## 2022-01-02 RX ADMIN — DEXTROSE AND SODIUM CHLORIDE: 5; 450 INJECTION, SOLUTION INTRAVENOUS at 00:15

## 2022-01-02 RX ADMIN — SODIUM CHLORIDE, PRESERVATIVE FREE 10 ML: 5 INJECTION INTRAVENOUS at 08:21

## 2022-01-02 RX ADMIN — DILTIAZEM HYDROCHLORIDE 5 MG/HR: 5 INJECTION INTRAVENOUS at 04:38

## 2022-01-02 RX ADMIN — DEXTROSE AND SODIUM CHLORIDE: 5; 450 INJECTION, SOLUTION INTRAVENOUS at 23:44

## 2022-01-02 RX ADMIN — THIAMINE HYDROCHLORIDE 100 MG: 100 INJECTION, SOLUTION INTRAMUSCULAR; INTRAVENOUS at 08:30

## 2022-01-02 RX ADMIN — CEFTRIAXONE 1000 MG: 1 INJECTION, POWDER, FOR SOLUTION INTRAMUSCULAR; INTRAVENOUS at 21:06

## 2022-01-02 RX ADMIN — DILTIAZEM HYDROCHLORIDE 10 MG/HR: 5 INJECTION INTRAVENOUS at 23:59

## 2022-01-02 ASSESSMENT — PAIN SCALES - PAIN ASSESSMENT IN ADVANCED DEMENTIA (PAINAD)
FACIALEXPRESSION: 0
FACIALEXPRESSION: 0
BODYLANGUAGE: 0
TOTALSCORE: 0
TOTALSCORE: 0
BODYLANGUAGE: 0
BREATHING: 0
CONSOLABILITY: 0
CONSOLABILITY: 0
TOTALSCORE: 0
CONSOLABILITY: 0
TOTALSCORE: 0
CONSOLABILITY: 0
BODYLANGUAGE: 0
BODYLANGUAGE: 0
NEGVOCALIZATION: 0
CONSOLABILITY: 0
CONSOLABILITY: 0
BREATHING: 0
CONSOLABILITY: 0
NEGVOCALIZATION: 0
BODYLANGUAGE: 0
BODYLANGUAGE: 0
FACIALEXPRESSION: 0
TOTALSCORE: 0
NEGVOCALIZATION: 0
FACIALEXPRESSION: 0
NEGVOCALIZATION: 0
CONSOLABILITY: 0
BREATHING: 0
NEGVOCALIZATION: 0
FACIALEXPRESSION: 0
NEGVOCALIZATION: 0
BREATHING: 0
TOTALSCORE: 0
FACIALEXPRESSION: 0
BREATHING: 0
CONSOLABILITY: 0
BREATHING: 0
TOTALSCORE: 0
BODYLANGUAGE: 0
CONSOLABILITY: 0
TOTALSCORE: 0
NEGVOCALIZATION: 0
NEGVOCALIZATION: 0
BREATHING: 0
NEGVOCALIZATION: 0
BODYLANGUAGE: 0
CONSOLABILITY: 0
BODYLANGUAGE: 0
BODYLANGUAGE: 0
TOTALSCORE: 0
FACIALEXPRESSION: 0
NEGVOCALIZATION: 0
FACIALEXPRESSION: 0
NEGVOCALIZATION: 0
BREATHING: 0
BODYLANGUAGE: 0
BREATHING: 0
NEGVOCALIZATION: 0
BODYLANGUAGE: 0
CONSOLABILITY: 0
TOTALSCORE: 0
FACIALEXPRESSION: 0
FACIALEXPRESSION: 0
NEGVOCALIZATION: 0
FACIALEXPRESSION: 0
BREATHING: 0
CONSOLABILITY: 0
BREATHING: 0
FACIALEXPRESSION: 0
FACIALEXPRESSION: 0
BODYLANGUAGE: 0
TOTALSCORE: 0

## 2022-01-02 ASSESSMENT — PAIN SCALES - GENERAL
PAINLEVEL_OUTOF10: 0

## 2022-01-02 NOTE — PROGRESS NOTES
Progress Note    Admit Date: 12/30/2021  Diet: Diet NPO Exceptions are: Sips of Water with Meds    CC: AMS    Interval history: COVID positive  Continues to say a few words but is incomprehensible. Does not appear to be in distress. Still in afib, tachy 100, no other issues, has not passed swallow eval will give NG for supplementation and meds    Medications:     Scheduled Meds:   thiamine (VITAMIN B1) IVPB  100 mg IntraVENous Q24H    [Held by provider] lisinopril  5 mg Oral Daily    [Held by provider] tamsulosin  0.4 mg Oral Daily    sodium chloride flush  5-40 mL IntraVENous 2 times per day    enoxaparin  40 mg SubCUTAneous Daily    cefTRIAXone (ROCEPHIN) IV  1,000 mg IntraVENous Q24H     Continuous Infusions:   sodium chloride 75 mL/hr at 01/02/22 1035    dilTIAZem 5 mg/hr (01/02/22 0438)    sodium chloride       PRN Meds:labetalol, sodium chloride flush, sodium chloride, ondansetron **OR** ondansetron, polyethylene glycol, acetaminophen **OR** acetaminophen    Objective:   Vitals:   T-max:  Patient Vitals for the past 8 hrs:   BP Temp Temp src Pulse Resp SpO2   01/02/22 1111 (!) 149/95 97 °F (36.1 °C) Axillary 97 20    01/02/22 0819 (!) 141/96 96.8 °F (36 °C) Axillary 99 20 92 %   01/02/22 0432 (!) 146/93 98.4 °F (36.9 °C) Axillary 92 20 94 %       Intake/Output Summary (Last 24 hours) at 1/2/2022 1129  Last data filed at 1/2/2022 4149  Gross per 24 hour   Intake 0 ml   Output 780 ml   Net -780 ml     Review of Systems   Unable to perform ROS: Mental status change       Physical Exam  Constitutional:       General: He is not in acute distress. Appearance: He is ill-appearing. He is not toxic-appearing. Comments: thin   HENT:      Head: Normocephalic. Right Ear: External ear normal.      Left Ear: External ear normal.      Nose: Nose normal.      Mouth/Throat:      Mouth: Mucous membranes are dry. Pharynx: Oropharynx is clear.  No oropharyngeal exudate or posterior oropharyngeal erythema. Eyes:      General:         Right eye: No discharge. Left eye: No discharge. Extraocular Movements: Extraocular movements intact. Conjunctiva/sclera: Conjunctivae normal.   Cardiovascular:      Rate and Rhythm: Tachycardia present. Rhythm irregular. Pulses: Normal pulses. Heart sounds: No murmur heard. Pulmonary:      Effort: Pulmonary effort is normal. No respiratory distress. Breath sounds: No wheezing or rales. Abdominal:      General: There is no distension. Palpations: Abdomen is soft. Tenderness: There is no abdominal tenderness. There is no guarding. Musculoskeletal:         General: No swelling or tenderness. Normal range of motion. Cervical back: Normal range of motion. No rigidity. Skin:     General: Skin is warm. Coloration: Skin is not jaundiced or pale. Neurological:      General: No focal deficit present. Mental Status: He is alert. Comments: Disoriented  Speech is difficult to comprehend           LABS:    CBC:   Recent Labs     12/31/21 0422 01/01/22 0501 01/02/22 0443   WBC 6.2 7.3 8.3   HGB 12.0* 12.8* 13.9   HCT 35.2* 38.1* 41.6    163 171   MCV 90.0 90.7 91.2     Renal:    Recent Labs     12/31/21 0421 01/01/22  0501 01/02/22 0443    136 134*   K 3.6 3.5 3.5    103 99   CO2 26 28 25   BUN 16 19 16   CREATININE <0.5* <0.5* <0.5*   GLUCOSE 103* 100* 131*   CALCIUM 8.1* 8.1* 7.9*   MG  --  1.70* 2.10   PHOS  --   --  2.3*   ANIONGAP 9 5 10     Hepatic:   Recent Labs     12/30/21 1906 12/31/21 0441 01/02/22 0443   AST 30 26  --    ALT 19 14  --    BILITOT 0.6 0.4  --    BILIDIR <0.2 <0.2  --    PROT 7.2 5.8*  --    LABALBU 4.4 3.2* 3.3*   ALKPHOS 93 68  --      Troponin:   Recent Labs     12/30/21 1906   TROPONINI <0.01     -----------------------------------------------------------------  RAD:   CT Head WO Contrast   Final Result      Cerebral atrophy.       Evidence of significant diffuse chronic small vessel white matter disease bilaterally. No acute intracranial findings. XR CHEST PORTABLE   Final Result      No acute cardiopulmonary findings. MRI BRAIN W WO CONTRAST    (Results Pending)         Assessment/Plan:     Acute metabolic encephalopathy likely 2/2 UTI  -Consider other etiology given AMS has been on going for a month. Will treat UTI first.   - Continue IV ceftriaxone 5 days (12/30-1/3)   - PT/OT for weakness  - Will likely benefit from neuropsych eval if no improvement on treatment of UTI  - F/U MRI w/wo contrast eval other etiology.   - NG tube placed will monitor first and consider starting tube feeds tomorrow if no improvement    Paroxsymal AFIB with RVR  - Will start on IVF 75ml/hr first normal saline 14 hours and transition to D5 half for nutritional support and for dehydration  - start dilt drip  - ECHO when out of isolation  - Will consult cardiology for recent recs on dilt and transition off drip     COVID 19 infection (stable)  - does not show signs of pneumonia at this time  - does not meet criteria for steroids, immune suppressants  - if worsens start steroids and check inflammatory markers  - will try to get monoclonal antibody. Malnourishment  - significant dysphagia, NPO  - give glucose in fluids  - give thiamine  - dietary following  - SLP eval    Chronic issues:   HTN - Continue home lisinopril - held while NPO can resume via tube  BPH - Continue home tamsulosin - held while NPO can resume via tube    Code Status: limited  FEN: NPO, concerns swallow okay to start tube feeds tomorrow if no issues with NG  PPX: lovenox  DISPO: needs improvement in clinical condition, mental status still an issue    Felix Coronel, , PGY-1  01/02/22  11:29 AM    This patient has will be staffed and discussed with Casandra Chavez MD.     Addendum to Resident H& P/Progress note:  I have personally seen,examined and evaluated the patient.  I have reviewed the current history, physical findings, labs and assessment and plan and agree with note as documented by resident MD ( )  + speech dysarthria; will order MRI of the brain to rule out acute CVA    Hilary Marshall MD, [facility]

## 2022-01-02 NOTE — PROGRESS NOTES
Pt's Spouse updated about pt's status and plans of care. All questions answered.  Electronically signed by Sherrie Banks RN on 1/2/2022 at 6:33 AM

## 2022-01-02 NOTE — CARE COORDINATION
Case Management Assessment           Initial Evaluation                Date / Time of Evaluation: 1/2/2022 9:14 AM                 Assessment Completed by: Dara Gonzalez RN     Chart reviewed. Pt is currently in isolation for COVID-19. He is also confused. A call was placed to his spouse, Nissa Gonzales, to complete initial assessment. At baseline, Mr. Sherry Hidalgo is alert and oriented x 4 and independent with all aspect os care. He is an active . He ambulates with a can. He has been going to different nursing facilities in the area over the last month to give communion to residents. The couple lives in a bi-level house with a basement. There is one step to enter. Given the decline in cognition and debility, discussed the potential need for Mr. Burton to go to a skilled nursing facility at time of discharge. Mrs. Sherry Hidalgo stated she was hoping he would come home because most of their family is out of town and she would be dependent on friends and neighbors to drive her to visit with him. Explained home care is an option. Encouraged her to discuss our conversation with her children so they can support her in making the choice of skilled facility versus returning home. Provided CM phone number for family to call back. Will provide a list of home care and SNF choices. Patient Name: Mark Burton     YOB: 1934  Diagnosis: Encephalopathy [G93.40]  Urinary tract infection without hematuria, site unspecified [X39.1]  Acute metabolic encephalopathy [I50.91]     Date / Time: 12/30/2021  6:07 PM    Patient Admission Status: Inpatient    If patient is discharged prior to next notation, then this note serves as note for discharge by case management.      Current PCP: Haze Harada, MD    Chart Reviewed: Yes  Patient/ Family Interviewed: Yes    Emergency Contacts:  Extended Emergency Contact Information  Primary Emergency Contact: Amanda Burton  Address: [address] [facility], [address] [place] [address] [address] [facility] Phone: [phone]  Relation: Spouse  Secondary Emergency Contact: Jovita Burton   [facility] [address] [facility] Phone: [phone]  Relation: Child    Advance Directives:   Code Status: Limited    Financial  Payor: MEDICARE / Plan: MEDICARE PART A AND B / Product Type: *No Product type* /     Pre-cert required for SNF: No    Pharmacy    [id number] [address] [place] [phone]  [address] [place] [address]  Phone: [phone] Fax: [phone]      Potential assistance Purchasing Medications: Potential Assistance Purchasing Medications: No    ADLS independent prior to admission  Support Systems: Spouse/Significant Other,Children    PT AM-PAC: 8 /24  OT AM-PAC:   /24    HOUSING  Home Environment: house  Steps: 1 HEATHER and 10 steps to basement    Home Care Information  Currently ACTIVE with [address]: No    Durable Medical Equipment  Equipment: cane    DISCHARGE PLAN:  Disposition: Anticipate SNF placement    Transportation PLAN for discharge: EMS transportation     The Patient and/or patient representative Gene and his family were provided with a choice of provider and agrees with the discharge plan Yes    Freedom of choice list was provided with basic dialogue that supports the patient's individualized plan of care/goals and shares the quality data associated with the providers.  Yes      Benita Landry RN  [facility] [employer]  Case Management Department

## 2022-01-02 NOTE — PROGRESS NOTES
Pt has been NPO, and he has not been taking his Flomax. Total urine output as at 1900 was 200 and the bladder scan was 455. He latter had 200 ml out and bladder scan 364 ml. Resident notified.

## 2022-01-02 NOTE — PLAN OF CARE
Problem: Falls - Risk of:  Goal: Will remain free from falls  Description: Will remain free from falls  1/2/2022 0939 by John Montez RN  Outcome: Ongoing    Bed in lowest position and all wheels locked. Bed alarm in use. X3 side rails up. Non skid footwear in use. Belongings and call light in reach. Problem: Skin Integrity:  Goal: Will show no infection signs and symptoms  Description: Will show no infection signs and symptoms  1/2/2022 0939 by John Montez RN  Outcome: Ongoing    Pt turned q2 hours. Skin assessment performed q shift.

## 2022-01-02 NOTE — PLAN OF CARE
Problem: Gas Exchange - Impaired  Goal: Absence of hypoxia  Outcome: Ongoing     Pt able to maintain respiration between 18-22 beats per minute, oxygen saturation greater then 90% without supplemental oxygen. Pt sleeping comfortably in bed . Will continue to monitor. Problem: Cardiac:  Goal: Hemodynamic stability will improve  Description: Hemodynamic stability will improve  1/1/2022 2122 by Lyndsay Alcantara RN  Outcome: Ongoing  Pt remains hemodynamically stable  with no signs of decrease cardiac output. Will continue to monitor. Problem: Mental Status - Impaired:  Goal: Mental status will improve  Description: Mental status will improve  Outcome: Ongoing   Pt is alert bu oriented to self only. Easily redirectable. He continues to get IV antibiotic for UTI.

## 2022-01-02 NOTE — PROGRESS NOTES
Speech Language Pathology  Facility/Department: [facility] [address] [facility]  Dysphagia Daily Treatment Note    NAME: Lexie Burton  : 1934  MRN: [id number]    Patient Diagnosis(es):   Patient Active Problem List    Diagnosis Date Noted    Rapid atrial fibrillation ([address]     Hypomagnesemia     Urinary tract infection without hematuria     Essential hypertension     Moderate malnutrition ([address]     Underweight     Acute metabolic encephalopathy     Pneumonia of right lower lobe due to infectious organism     Sepsis ([address] 11/15/2017    Sepsis due to pneumonia ([address] 11/15/2017    Volume depletion     [facility]     Upper respiratory tract infection 2017    Weakness generalized 02/10/2017    Dizziness 10/04/2016    Rib pain on right side 10/04/2016    Fall as cause of accidental injury in home as place of occurrence 10/04/2016    Injury of tendon of long head of right biceps 2016     Allergies: No Known Allergies      Recent Chest Xray: 2021  Impression       No acute cardiopulmonary findings.         CT head 21  Impression       Cerebral atrophy.       Evidence of significant diffuse chronic small vessel white matter disease bilaterally.       No acute intracranial findings. Chart reviewed. Medical Diagnosis: Encephalopathy [G93.40]  Urinary tract infection without hematuria, site unspecified [E04.6]  Acute metabolic encephalopathy [G22.40]   Treatment Diagnosis:dysphagia     BSE Impression 21-  Dysphagia Impression : Pt is upright in bed, naked (nursing endorsing pt will not keep clothing / hospital equipment on due to AMS) and unable to answer orientation questions besides name with multiple repetition / cues to elicit. Pt is unable to particpiate in oral mech but no overt asymmetry is noted and does open mouth to command which reveals xerostomia. Pt opens mouth and verbalizes agreement for PO but does not close mouth around spoon.  With pressure to lingual surface plus max verbal cues mouth closure achieved and with what appears to be absent control of thin liquids. Anterior loss x2 with thins / nectar via tsp. Lingual pumping with nectars and 1/4 tsp puree. Pt with what appeared to be immediate loss to pharynx with thins via tsp consistently with a laryngeal elevation with absent swallow and coughing prior to/during swallow initiation that was prolonged and substantial. The patient demo'd immediate throat clear with small amount of puree. All textures result in multiple swallows per bolus and weak/slightly dysphonic nose make bedside assessment difficult. Pt can not provide explanation of sensations but did state \"it's biting me\" when experienced difficulty with swallow. Recommend ice chips only to stimulate swallow reflex and protect oral mucosa. NPO / meds via alternative means / oral care via suction toothette system. Dysphagia Diagnosis: Suspected needs further assessment,Severe pharyngeal stage dysphagia,Severe oral stage dysphagia    MBS results - not appropriate at this time to participate in instrumental evaluation    Pain: grimacing and discomfort with swallowing    Current Diet : Diet NPO Exceptions are: Sips of Water with Meds   Recommended Form of Meds: Via alternative means of nutrition  Compensatory Swallowing Strategies:  (staff assist for ice chips following oral care)     Treatment:  Pt seen bedside to address the following goals:  Short-term Goals  Goal 1: The patient will tolerate repeat BSE when able. 1/1- pt alert, confused, but likely less confused as compared to yesterday. Pt was able to follow some commands and responded appropriately to a few questions. Pt had difficulty with closing lips around spoon for all PO trials. With ice chips, pt making a disorganized chewing motion with mouth, but no evidence of teeth masticating ice chip.  An eventual multiple swallow response noted, but unable to assess vocal quality following swallow as pt did not respond to question following the swallow. With applesauce, pt with extremely prolonged mastication with multiple swallows noted, which could be indicative of pharyngeal residue, placing pt at risk for aspiration after the swallow. With trials of water by tsp- pt with poor lip closure and suspected premature loss to the pharynx. With first  2 sips of water by straw, pt was able to draw water up straw with swallow motion noted, but pt then swallowed multiple times. When attempted additional sips of water by straw, pt then blew into the straw. Given pt's degree of confusion, impaired mastication and multiple swallows with all trials, pt is not safe for PO intake. Recommend con''t NPO with oral care with suction and small amounts of ice chips to encourage swallow function. con't goal   1/2: RN reported pt with improved mental status this date. Pt in chair upon entry, agreeable to session. Reduced movement of articulators and reduced mandibular closure noted with speech and initial PO trials - max cues to achieve labial / mandibular closure for adequate containment of ice chip. Suspect potentially reduced palatal closure given altered resonance during speech and audible loss of air through nares when attempting to draw liquid via straw. Analyzed with ice chips, tsp thins, straw sip thin, and puree. With all PO trials, pt with prolonged oral prep and perseverative on tremulous munching pattern and lateral mandibular movements. Even after bolus gone from oral cavity, pt continued to demonstrate masticatory movements and swallows, requiring max cues to d/c motoric perseverations. Pt with holding of all trials and appeared effortful / painful for posterior propulsion + swallow as pt with grimacing and endorsed discomfort; absent posterior propulsion intermittently requiring verbal + tactile cues.  Multiple swallows demonstrated with all trials concerning for poor passage through pharynx and coughing or audible gurgling noted with all. Given overt indicators of oropharyngeal dysfunction and mostly nonfunctional swallow at this time with any consistency trialed, recommend continue NPO + ice chips for stimulation of swallow function. Pt not appropriate for instrumental evaluation until consistently propelling all PO to pharynx. Continue goal.          Patient/Family/Caregiver Education:  Educated to role of SLP, purpose of visit, deficits noted, and recommendations. Question full comprehension. Compensatory Strategies:  Compensatory Swallowing Strategies:  (staff assist for ice chips following oral care)      Plan:  Dysphagia tx 3-5x/wk for LOS  Diet recommendations: NPO + ice chips; consider alternative means of nutrition    Recommend increase frequency of oral care to reduce risk of aspiration PNA. As pt is unable to expectorate at this time, adequate oral care would consist of cleaning entire oral cavity with suction kit 3x/day. DC recommendation: TBD closer to discharge   Treatment: 15 min  D/W nursing, Giselle  Needs met prior to leaving room, call button in reach. Christian Elam MA CCC-SLP; [phone]  Speech-Language Pathologist  Pager # [phone]  Phone # [phone]  Office # [phone]    If patient is discharged prior to next session, this note will serve as discharge summary.

## 2022-01-02 NOTE — PROGRESS NOTES
Occupational Therapy   Occupational Therapy Initial Assessment and Treatment  Date: 2022   Patient Name: Donovan Clark  MRN: [id number]     : 1934    Date of Service: 2022    Discharge Recommendations:  Mark Burton scored a  on the AM-PAC ADL Inpatient form. Current research shows that an AM-PAC score of 17 or less is typically not associated with a discharge to the patient's home setting. Based on the patient's AM-PAC score and their current ADL deficits, it is recommended that the patient have 3-5 sessions per week of Occupational Therapy at d/c to increase the patient's independence. Please see assessment section for further patient specific details. If patient discharges prior to next session this note will serve as a discharge summary. Please see below for the latest assessment towards goals. OT Equipment Recommendations  Equipment Needed: No  Other: DEFER    Assessment   Performance deficits / Impairments: Decreased functional mobility ; Decreased ADL status; Decreased strength;Decreased safe awareness;Decreased cognition;Decreased endurance;Decreased balance;Decreased high-level IADLs;Decreased fine motor control;Decreased coordination;Decreased posture  After evaluation and treatment, pt found to be presenting with the above mentioned deficits. Pt would benefit from continued skilled occupational therapy to address these deficits, increasing safety and independence with ADL and functional mobility. Pt normally independent per chart, now confused and requiring max A for stand step t/f and TA for LB ADL. Will continue to assess for discharge needs.      Treatment Diagnosis: decreased ability to perform ADL 2/2 COVID, possible UTI  Prognosis: Good  Decision Making: High Complexity  REQUIRES OT FOLLOW UP: Yes  Activity Tolerance  Activity Tolerance: Treatment limited secondary to decreased cognition;Patient Tolerated treatment well  Safety Devices  Safety Devices in place: Yes  Type of devices: Call light within reach; Chair alarm in place; Left in chair;Nurse notified;Gait belt ([employer])           Patient Diagnosis(es): The primary encounter diagnosis was Encephalopathy. A diagnosis of Urinary tract infection without hematuria, site unspecified was also pertinent to this visit. has a past medical history of Bilateral pneumonia and BPH (benign prostatic hyperplasia). has no past surgical history on file. Treatment Diagnosis: decreased ability to perform ADL 2/2 COVID, possible UTI      Restrictions  Restrictions/Precautions  Restrictions/Precautions: Up as Tolerated  Position Activity Restriction  Other position/activity restrictions: Up with assist    Subjective   General  Chart Reviewed: Yes  Patient assessed for rehabilitation services?: Yes  Family / Caregiver Present: No  Referring Practitioner: Divya Reyes MD  Diagnosis: Pt admitted on 12/30/21 with encephalopathy. Pt found to be COVID + with possible UTI, c/b Paroxsymal AFIB with RVR. CT head: (-) acute. Subjective  Subjective: RN cleared pt for tx. Pt supine at session start. Patient Currently in Pain: Denies    Social/Functional History  Social/Functional History  Lives With: Spouse  Type of Home: House  Home Layout: Multi-level  Home Access:  (1 HEATHER)  ADL Assistance: Independent  Homemaking Assistance: Independent  Ambulation Assistance: Independent  Transfer Assistance: [address]: had been giving communion at nursing home  Additional Comments: Pt is a poor historian. No family present. Information obtained from social work note in chart. Normally fully independent. Objective   Vision: Impaired  Vision Exceptions: Wears glasses at all times  Hearing: Within functional limits          Balance  Sitting Balance:  Moderate assistance (L lean, able to correct to midline with SBA after therapeutic intervention and use of bed rail)  Standing Balance: Maximum assistance (L lean with RW initially with static balance, progressing to min A static balance; max A with RW during t/f to chair)  Functional Mobility  Functional Mobility Comments: Max A with RW to walk ~3ft bed to chair. ADL  Feeding: NPO  LE Dressing: Dependent/Total (brief change supine)  Toileting: Dependent/Total (condom cath, incontinent of BM, TA for supine hygiene)  Tone RUE  RUE Tone: Normotonic  Tone LUE  LUE Tone: Normotonic  Coordination  Movements Are Fluid And Coordinated: Yes     Bed mobility  Rolling to Left: Maximum assistance  Rolling to Right: Maximum assistance  Supine to Sit: Maximum assistance  Scooting: Maximal assistance  Transfers  Sit to stand: Maximum assistance  Stand to sit: Maximum assistance  Transfer Comments: with RW, max VCs     Cognition  Overall Cognitive Status: Exceptions  Arousal/Alertness: Delayed responses to stimuli  Following Commands: Follows one step commands with increased time; Follows one step commands with repetition  Attention Span: Difficulty dividing attention; Difficulty attending to directions  Memory: Decreased recall of recent events;Decreased short term memory  Safety Judgement: Decreased awareness of need for assistance;Decreased awareness of need for safety  Problem Solving: Assistance required to generate solutions;Assistance required to implement solutions;Assistance required to identify errors made;Assistance required to correct errors made;Decreased awareness of errors  Insights: Decreased awareness of deficits  Initiation: Requires cues for all  Sequencing: Requires cues for all                                BUE strength and AROM are [facility] based on functional presentation.     Education: Role of OT, safe t/f training, safe use of DME, awareness of deficits, discharge planning, ADL as therapeutic exercise, importance of OOB, cognitive orientation, energy conservation/breathing techniques        Plan   Plan  Times per week: 2-5  Current Treatment Recommendations: Strengthening,ROM,Balance Training,Functional Mobility Training,Endurance Training,Cognitive Reorientation,Patient/Caregiver Education & Training,Equipment Evaluation, Education, & procurement,Home Management Training,Self-Care / ADL,Safety Education & Training    AM-PAC Score        AM-PAC Inpatient Daily Activity Raw Score: 7 (01/02/22 1504)  AM-PAC Inpatient ADL T-Scale Score : 20.13 (01/02/22 1504)  ADL Inpatient CMS 0-100% Score: 92.44 (01/02/22 1504)  ADL Inpatient CMS G-Code Modifier : CM (01/02/22 1504)    Goals  Short term goals  Time Frame for Short term goals: 1 week  Short term goal 1: 1 grooming task EOB with SBA  Short term goal 2: Functional t/f with mod A with RW  Short term goal 3: LB dressing with max A  Patient Goals   Patient goals : \"See my wife. \"       Therapy Time   Individual Concurrent Group Co-treatment   Time In 1314         Time Out 6126         Minutes 38         Timed Code Treatment Minutes: 23 Minutes       If patient is discharged prior to next treatment session, this note will serve as the discharge summary.   Katrin Pitts, OTR/L #[id number]

## 2022-01-03 LAB
ALBUMIN SERPL-MCNC: 3.2 G/DL (ref 3.4–5)
ANION GAP SERPL CALCULATED.3IONS-SCNC: 9 MMOL/L (ref 3–16)
BLOOD CULTURE, ROUTINE: NORMAL
BUN BLDV-MCNC: 16 MG/DL (ref 7–20)
CALCIUM SERPL-MCNC: 7.8 MG/DL (ref 8.3–10.6)
CHLORIDE BLD-SCNC: 99 MMOL/L (ref 99–110)
CO2: 28 MMOL/L (ref 21–32)
CREAT SERPL-MCNC: <0.5 MG/DL (ref 0.8–1.3)
CULTURE, BLOOD 2: NORMAL
GFR AFRICAN AMERICAN: >60
GFR NON-AFRICAN AMERICAN: >60
GLUCOSE BLD-MCNC: 123 MG/DL (ref 70–99)
HCT VFR BLD CALC: 41.1 % (ref 40.5–52.5)
HEMOGLOBIN: 14 G/DL (ref 13.5–17.5)
MAGNESIUM: 2 MG/DL (ref 1.8–2.4)
MCH RBC QN AUTO: 31 PG (ref 26–34)
MCHC RBC AUTO-ENTMCNC: 34.1 G/DL (ref 31–36)
MCV RBC AUTO: 90.9 FL (ref 80–100)
PDW BLD-RTO: 13.1 % (ref 12.4–15.4)
PHOSPHORUS: 2.2 MG/DL (ref 2.5–4.9)
PLATELET # BLD: 173 K/UL (ref 135–450)
PMV BLD AUTO: 9 FL (ref 5–10.5)
POTASSIUM SERPL-SCNC: 3.1 MMOL/L (ref 3.5–5.1)
RBC # BLD: 4.52 M/UL (ref 4.2–5.9)
SODIUM BLD-SCNC: 136 MMOL/L (ref 136–145)
WBC # BLD: 7.2 K/UL (ref 4–11)

## 2022-01-03 PROCEDURE — 6360000002 HC RX W HCPCS: Performed by: STUDENT IN AN ORGANIZED HEALTH CARE EDUCATION/TRAINING PROGRAM

## 2022-01-03 PROCEDURE — 99232 SBSQ HOSP IP/OBS MODERATE 35: CPT | Performed by: HOSPITALIST

## 2022-01-03 PROCEDURE — 2580000003 HC RX 258: Performed by: STUDENT IN AN ORGANIZED HEALTH CARE EDUCATION/TRAINING PROGRAM

## 2022-01-03 PROCEDURE — 83735 ASSAY OF MAGNESIUM: CPT

## 2022-01-03 PROCEDURE — 2060000000 HC ICU INTERMEDIATE R&B

## 2022-01-03 PROCEDURE — 2500000003 HC RX 250 WO HCPCS: Performed by: STUDENT IN AN ORGANIZED HEALTH CARE EDUCATION/TRAINING PROGRAM

## 2022-01-03 PROCEDURE — 99223 1ST HOSP IP/OBS HIGH 75: CPT | Performed by: NURSE PRACTITIONER

## 2022-01-03 PROCEDURE — 97110 THERAPEUTIC EXERCISES: CPT

## 2022-01-03 PROCEDURE — 97530 THERAPEUTIC ACTIVITIES: CPT

## 2022-01-03 PROCEDURE — 92526 ORAL FUNCTION THERAPY: CPT

## 2022-01-03 PROCEDURE — 85027 COMPLETE CBC AUTOMATED: CPT

## 2022-01-03 PROCEDURE — 80069 RENAL FUNCTION PANEL: CPT

## 2022-01-03 PROCEDURE — 36415 COLL VENOUS BLD VENIPUNCTURE: CPT

## 2022-01-03 RX ORDER — POTASSIUM CHLORIDE 7.45 MG/ML
10 INJECTION INTRAVENOUS
Status: COMPLETED | OUTPATIENT
Start: 2022-01-03 | End: 2022-01-03

## 2022-01-03 RX ADMIN — CEFTRIAXONE 1000 MG: 1 INJECTION, POWDER, FOR SOLUTION INTRAMUSCULAR; INTRAVENOUS at 20:44

## 2022-01-03 RX ADMIN — ENOXAPARIN SODIUM 40 MG: 100 INJECTION SUBCUTANEOUS at 07:52

## 2022-01-03 RX ADMIN — POTASSIUM CHLORIDE 10 MEQ: 7.45 INJECTION INTRAVENOUS at 07:10

## 2022-01-03 RX ADMIN — SODIUM CHLORIDE, PRESERVATIVE FREE 10 ML: 5 INJECTION INTRAVENOUS at 07:52

## 2022-01-03 RX ADMIN — POTASSIUM CHLORIDE 10 MEQ: 7.45 INJECTION INTRAVENOUS at 09:57

## 2022-01-03 RX ADMIN — POTASSIUM CHLORIDE 10 MEQ: 7.45 INJECTION INTRAVENOUS at 11:10

## 2022-01-03 RX ADMIN — DILTIAZEM HYDROCHLORIDE 5 MG/HR: 5 INJECTION INTRAVENOUS at 20:45

## 2022-01-03 RX ADMIN — POTASSIUM CHLORIDE 10 MEQ: 7.45 INJECTION INTRAVENOUS at 08:57

## 2022-01-03 RX ADMIN — THIAMINE HYDROCHLORIDE 100 MG: 100 INJECTION, SOLUTION INTRAMUSCULAR; INTRAVENOUS at 12:28

## 2022-01-03 RX ADMIN — SODIUM CHLORIDE 25 ML: 9 INJECTION, SOLUTION INTRAVENOUS at 20:43

## 2022-01-03 RX ADMIN — SODIUM CHLORIDE, PRESERVATIVE FREE 10 ML: 5 INJECTION INTRAVENOUS at 20:42

## 2022-01-03 ASSESSMENT — PAIN SCALES - PAIN ASSESSMENT IN ADVANCED DEMENTIA (PAINAD)
NEGVOCALIZATION: 0
NEGVOCALIZATION: 0
BREATHING: 0
BREATHING: 0
CONSOLABILITY: 0
NEGVOCALIZATION: 0
BODYLANGUAGE: 0
NEGVOCALIZATION: 0
CONSOLABILITY: 0
NEGVOCALIZATION: 0
CONSOLABILITY: 0
TOTALSCORE: 0
TOTALSCORE: 0
FACIALEXPRESSION: 0
NEGVOCALIZATION: 0
CONSOLABILITY: 0
TOTALSCORE: 0
BODYLANGUAGE: 0
NEGVOCALIZATION: 0
NEGVOCALIZATION: 0
TOTALSCORE: 0
BODYLANGUAGE: 0
NEGVOCALIZATION: 0
CONSOLABILITY: 0
BODYLANGUAGE: 0
TOTALSCORE: 0
BREATHING: 0
BODYLANGUAGE: 0
FACIALEXPRESSION: 0
BODYLANGUAGE: 0
BREATHING: 0
BREATHING: 0
FACIALEXPRESSION: 0
CONSOLABILITY: 0
FACIALEXPRESSION: 0
NEGVOCALIZATION: 0
CONSOLABILITY: 0
BODYLANGUAGE: 0
BREATHING: 0
BREATHING: 0
BODYLANGUAGE: 0
BODYLANGUAGE: 0
BREATHING: 0
NEGVOCALIZATION: 0
FACIALEXPRESSION: 0
CONSOLABILITY: 0
BODYLANGUAGE: 0
TOTALSCORE: 0
BREATHING: 0
TOTALSCORE: 0
FACIALEXPRESSION: 0
BREATHING: 0
FACIALEXPRESSION: 0
TOTALSCORE: 0
BODYLANGUAGE: 0
FACIALEXPRESSION: 0
CONSOLABILITY: 0
FACIALEXPRESSION: 0
CONSOLABILITY: 0
CONSOLABILITY: 0
TOTALSCORE: 0
BREATHING: 0
CONSOLABILITY: 0
TOTALSCORE: 0
TOTALSCORE: 0
BREATHING: 0
BODYLANGUAGE: 0
TOTALSCORE: 0
CONSOLABILITY: 0
BREATHING: 0
NEGVOCALIZATION: 0
NEGVOCALIZATION: 0
BODYLANGUAGE: 0
TOTALSCORE: 0

## 2022-01-03 ASSESSMENT — PAIN SCALES - GENERAL
PAINLEVEL_OUTOF10: 0

## 2022-01-03 NOTE — PROGRESS NOTES
Occupational Therapy  Facility/Department: [place] [address] [facility]  Daily Treatment Note  NAME: Lalit Veliz  : 1934  MRN: [id number]    Date of Service: 1/3/2022    Discharge Recommendations:  Mark Burton scored a  on the AM-PAC ADL Inpatient form. Current research shows that an AM-PAC score of 17 or less is typically not associated with a discharge to the patient's home setting. Based on the patient's AM-PAC score and their current ADL deficits, it is recommended that the patient have 3-5 sessions per week of Occupational Therapy at d/c to increase the patient's independence. Please see assessment section for further patient specific details. If patient discharges prior to next session this note will serve as a discharge summary. Please see below for the latest assessment towards goals. OT Equipment Recommendations  Equipment Needed: No  Other: Defer to next level of care    Assessment   Performance deficits / Impairments: Decreased functional mobility ; Decreased ADL status; Decreased strength;Decreased safe awareness;Decreased cognition;Decreased endurance;Decreased balance;Decreased high-level IADLs;Decreased fine motor control;Decreased coordination;Decreased posture  Assessment: Pt is very stiff and requires assist of 2 for mobility. Pt did have improving sitting balance/tolerance today. Pt cont to req max assist for ADL. Cont OT tx per plan of care.   Treatment Diagnosis: decreased ability to perform ADL 2/2 COVID, possible UTI  Prognosis: Good  OT Education: OT Role;Plan of Care  Patient Education: Needs reinforcement  REQUIRES OT FOLLOW UP: Yes  Activity Tolerance  Activity Tolerance: Patient Tolerated treatment well  Safety Devices  Safety Devices in place: Yes  Type of devices: Left in bed;Bed alarm in place;Call light within reach;Nurse notified       Restrictions  Restrictions/Precautions  Restrictions/Precautions: Up as Tolerated  Position Activity Restriction  Other position/activity restrictions: Up with assist  Subjective   General  Chart Reviewed: Yes  Patient assessed for rehabilitation services?: Yes  Family / Caregiver Present: No  Referring Practitioner: Stephen Resendiz MD  Diagnosis: Pt admitted on 21 with encephalopathy. Pt found to be COVID + with possible UTI, c/b Paroxsymal AFIB with RVR. CT head: (-) acute. Subjective  Subjective: Pt in bed upon entry. Good job. Vital Signs  Patient Currently in Pain: Denies   Orientation  Orientation  Overall Orientation Status: Impaired (Responds to name)  Objective    ADL  Grooming: Setup;Maximum assistance (Pt unable to wipe his face)  Toileting:  (catheter)  Additional Comments: Pt unable to remove/replace his glasses        Balance  Sitting Balance: Contact guard assistance (sitting edge of bed ~12 min. Toward end, pt leaned to left.)  Standing Balance: Dependent/Total (max assist x2 for partial stand)  Bed mobility  Rolling to Left: Maximum assistance  Rolling to Right: Maximum assistance  Supine to Sit: Maximum assistance;2 Person assistance  Sit to Supine: Dependent/Total;2 Person assistance  Scootin Person assistance;Dependent/Total  Transfers  Sit to stand: 2 Person assistance (max assist x2 from raised bed to partial stand at walker)  Stand to sit: 2 Person assistance (mod assist x2)                       Cognition  Overall Cognitive Status: Exceptions  Arousal/Alertness: Delayed responses to stimuli  Following Commands: Follows one step commands with increased time; Follows one step commands with repetition  Attention Span: Difficulty dividing attention; Difficulty attending to directions  Memory: Decreased recall of recent events;Decreased short term memory  Safety Judgement: Decreased awareness of need for assistance;Decreased awareness of need for safety  Problem Solving: Assistance required to generate solutions;Assistance required to implement solutions;Assistance required to identify errors made;Assistance required to correct errors made;Decreased awareness of errors  Insights: Decreased awareness of deficits  Initiation: Requires cues for all  Sequencing: Requires cues for all                    Type of ROM/Therapeutic Exercise  Type of ROM/Therapeutic Exercise: AAROM  Exercises  Shoulder Flexion: x5                    Plan   Plan  Times per week: 2-5  Current Treatment Recommendations: Strengthening,ROM,Balance Training,Functional Mobility Training,Endurance Training,Cognitive Reorientation,Patient/Caregiver Education & Training,Equipment Evaluation, Education, & procurement,Home Management Training,Self-Care / ADL,Safety Education & Training    AM-PAC Score        AM-[facility] Inpatient Daily Activity Raw Score: 7 (01/03/22 1535)  AM-PAC Inpatient ADL T-Scale Score : 20.13 (01/03/22 1535)  ADL Inpatient CMS 0-100% Score: 92.44 (01/03/22 1535)  ADL Inpatient CMS G-Code Modifier : CM (01/03/22 1535)    Goals                             No goals met  Short term goals  Time Frame for Short term goals: 1 week  Short term goal 1: 1 grooming task EOB with SBA  Short term goal 2: Functional t/f with mod A with RW  Short term goal 3: LB dressing with max A  Patient Goals   Patient goals : \"See my wife. \"       Therapy Time   Individual Concurrent Group Co-treatment   Time In 4117         Time Out 1509         Minutes 26         Timed Code Treatment Minutes: 26 Minutes    Total Treatment [facility], OTR/L [id number]

## 2022-01-03 NOTE — CARE COORDINATION
CM spoke with patient's son Rene Hall over the phone. He is assisting his mother in finding a SNF for placement at discharge. CM emailed him a list of COVID + SNF's and discussed sending referrals to check bed availability. CM faxed referrals to list of COVID + SNF's, no pre-cert needed when accepted. [address] [facility] able to accept, no bed available today, possibly in a couple of days. [facility] not taking COVID +, but would consider if 10days past first test +.      Miguel Yanez, RN, BSN,   4th Floor Progressive Care Unit  [phone]

## 2022-01-03 NOTE — PROGRESS NOTES
Speech Language Pathology  Facility/Department: [place] [address] [facility]  Dysphagia Daily Treatment Note    NAME: Chaka Hernandez  : 1934  MRN: [id number]    Patient Diagnosis(es):   Patient Active Problem List    Diagnosis Date Noted    Dysarthria     Rapid atrial fibrillation ([address]     Hypomagnesemia     Urinary tract infection without hematuria     Essential hypertension     Moderate malnutrition ([address]     Underweight     Acute metabolic encephalopathy     Pneumonia of right lower lobe due to infectious organism     Sepsis ([address] 11/15/2017    Sepsis due to pneumonia ([address] 11/15/2017    Volume depletion     [facility]acute [facility]) [facility]     Upper respiratory tract infection 2017    Weakness generalized 02/10/2017    Dizziness 10/04/2016    Rib pain on right side 10/04/2016    Fall as cause of accidental injury in home as place of occurrence 10/04/2016    Injury of tendon of long head of right biceps 2016     Allergies: No Known Allergies      Recent Chest Xray: 2021  Impression       No acute cardiopulmonary findings.         CT head 21  Impression       Cerebral atrophy.       Evidence of significant diffuse chronic small vessel white matter disease bilaterally.       No acute intracranial findings. Chart reviewed. Medical Diagnosis: Encephalopathy [G93.40]  Urinary tract infection without hematuria, site unspecified [T26.9]  Acute metabolic encephalopathy [E15.02]   Treatment Diagnosis:dysphagia     BSE Impression 21-  Dysphagia Impression : Pt is upright in bed, naked (nursing endorsing pt will not keep clothing / hospital equipment on due to AMS) and unable to answer orientation questions besides name with multiple repetition / cues to elicit. Pt is unable to particpiate in oral mech but no overt asymmetry is noted and does open mouth to command which reveals xerostomia.  Pt opens mouth and verbalizes agreement for PO but does not close mouth around spoon. With pressure to lingual surface plus max verbal cues mouth closure achieved and with what appears to be absent control of thin liquids. Anterior loss x2 with thins / nectar via tsp. Lingual pumping with nectars and 1/4 tsp puree. Pt with what appeared to be immediate loss to pharynx with thins via tsp consistently with a laryngeal elevation with absent swallow and coughing prior to/during swallow initiation that was prolonged and substantial. The patient demo'd immediate throat clear with small amount of puree. All textures result in multiple swallows per bolus and weak/slightly dysphonic nose make bedside assessment difficult. Pt can not provide explanation of sensations but did state \"it's biting me\" when experienced difficulty with swallow. Recommend ice chips only to stimulate swallow reflex and protect oral mucosa. NPO / meds via alternative means / oral care via suction toothette system. Dysphagia Diagnosis: Suspected needs further assessment,Severe pharyngeal stage dysphagia,Severe oral stage dysphagia    MBS results - not appropriate at this time to participate in instrumental evaluation    Pain: grimacing and discomfort with swallowing    Current Diet : Diet NPO Exceptions are: Sips of Water with Meds   Recommended Form of Meds: Via alternative means of nutrition  Compensatory Swallowing Strategies:  (staff assist for ice chips following oral care)     Treatment:  Pt seen bedside to address the following goals:  Short-term Goals  Goal 1: The patient will tolerate repeat BSE when able. 1/1- pt alert, confused, but likely less confused as compared to yesterday. Pt was able to follow some commands and responded appropriately to a few questions. Pt had difficulty with closing lips around spoon for all PO trials. With ice chips, pt making a disorganized chewing motion with mouth, but no evidence of teeth masticating ice chip.  An eventual multiple swallow response noted, but unable to assess vocal quality following swallow as pt did not respond to question following the swallow. With applesauce, pt with extremely prolonged mastication with multiple swallows noted, which could be indicative of pharyngeal residue, placing pt at risk for aspiration after the swallow. With trials of water by tsp- pt with poor lip closure and suspected premature loss to the pharynx. With first  2 sips of water by straw, pt was able to draw water up straw with swallow motion noted, but pt then swallowed multiple times. When attempted additional sips of water by straw, pt then blew into the straw. Given pt's degree of confusion, impaired mastication and multiple swallows with all trials, pt is not safe for PO intake. Recommend con''t NPO with oral care with suction and small amounts of ice chips to encourage swallow function. con't goal   1/2: RN reported pt with improved mental status this date. Pt in chair upon entry, agreeable to session. Reduced movement of articulators and reduced mandibular closure noted with speech and initial PO trials - max cues to achieve labial / mandibular closure for adequate containment of ice chip. Suspect potentially reduced palatal closure given altered resonance during speech and audible loss of air through nares when attempting to draw liquid via straw. Analyzed with ice chips, tsp thins, straw sip thin, and puree. With all PO trials, pt with prolonged oral prep and perseverative on tremulous munching pattern and lateral mandibular movements. Even after bolus gone from oral cavity, pt continued to demonstrate masticatory movements and swallows, requiring max cues to d/c motoric perseverations. Pt with holding of all trials and appeared effortful / painful for posterior propulsion + swallow as pt with grimacing and endorsed discomfort; absent posterior propulsion intermittently requiring verbal + tactile cues.  Multiple swallows demonstrated with all trials concerning for poor passage through pharynx and coughing or audible gurgling noted with all. Given overt indicators of oropharyngeal dysfunction and mostly nonfunctional swallow at this time with any consistency trialed, recommend continue NPO + ice chips for stimulation of swallow function. Pt not appropriate for instrumental evaluation until consistently propelling all PO to pharynx. Continue goal.   1/3- pt lethargic, but woke to voice. Pt with limited verbalizations and limited ability to follow commands. Pt with poor lip closure around spoon for trials with ice chips. Once ice chips was in the oral cavity, pt with reduced mastication with munch type pattern with open mouth posture- appeared to manipulate ice chip with tongue vs masticate with teeth. Laryngeal movement noted following all trials with ice chips, but movement appeared sluggish. Pt with throat clearing following 3 of 5 trials with ice chips. With trials of water by straw- pt unable to adequately close lips around straw or draw water up straw. Filled end of straw and inserted into the oral cavity while encouraging pt to close lips around the straw. Once released water into the oral cavity, laryngeal movement noted, but suspected was delayed, again with sluggish movement. Pt with throat clearing and/or weak cough following 4/4 trials with straw with multiple swallows noted following 3/4 trials. Again, attempted to have pt draw water up the straw for last trial, but pt still unable to perform this task despite max verbal and tactile cues. Recommend con't NPO with oral care with suction and small amounts of ice chips to encourage swallow function. Pt not yet appropriate for instrumental assessment. Patient/Family/Caregiver Education:  Educated to role of SLP, purpose of visit, deficits noted, and recommendations. Question full comprehension.     Compensatory Strategies:  staff assist for ice chips following oral care  Upright   Provided on ice chip at a time with verbal cues to encourage lip closure around spoon and to encourage swallow     Plan:  Dysphagia tx 3-5x/wk for LOS  Diet recommendations: NPO + ice chips; consider alternative means of nutrition    Recommend increase frequency of oral care to reduce risk of aspiration PNA. As pt is unable to expectorate at this time, adequate oral care would consist of cleaning entire oral cavity with suction kit 3x/day. DC recommendation: TBD closer to discharge   Treatment: 15 min  D/W nursing, Giselle  Needs met prior to leaving room, call button in reach. Asia Hayes [address]  Speech-Language Pathologist  Pager [phone]      If patient is discharged prior to next session, this note will serve as discharge summary.

## 2022-01-03 NOTE — ACP (ADVANCE CARE PLANNING)
Advance Care Planning     Advance Care Planning Inpatient Note  Spiritual Care Department    Today's Date: 1/3/2022  Unit: [facility]    Received request from shadia. Upon review of chart and communication with care team, Spiritual Care will defer advance care planning with patient at this time. Bill Manzo spoke with his wife instead. Spouse by phone    Goals of ACP Conversation:  Discuss advance care planning documents    Health Care Decision Makers:       Primary Decision Maker: Amanda Burton - Spouse - [phone]    Secondary Decision Maker: Lala Burton - Child - [phone]    Summary:  [address]  Patient has HCPOA paperwork at home and with his physician. We have only the first page of the document in our records. Advance Care Planning Documents (Patient Wishes):  Healthcare Power of /Advance Directive Appointment of [address]  Living Will/Advance Directive   Wife and doctors office have them    Assessment:  Spoke with patient's wife. Her children all live out of state. Daughter, Hellen Schroeder from [place] is on her way into town. Neighbors currently provide support to wife. Pt and family are Sikh. 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  Had prayer and conversation with patient's wife.      Interventions:  Requested patient/family to submit existing document for our records: Healthcare Power of /Advance Directive [address] Preferences Communicated:   No    Outcomes/Plan:  ACP Discussion: Completed  Teach Back Method used to verify the patient's and/or Healthcare Decision Maker's understanding of key information in the advance directive documents    Electronically signed by Staci Townsend [facility] on 1/3/2022 at 2:57 PM

## 2022-01-03 NOTE — PROGRESS NOTES
Physical Therapy  Facility/Department: [place] [address] [facility]  Daily Treatment Note  NAME: Clinton Burton  : 1934  MRN: [id number]    Date of Service: 1/3/2022    Discharge Recommendations: Mark Burton scored a  on the AM-PAC short mobility form. Current research shows that an AM-PAC score of 17 or less is typically not associated with a discharge to the patient's home setting. Based on the patient's AM-PAC score and their current functional mobility deficits, it is recommended that the patient have 3-5 sessions per week of Physical Therapy at d/c to increase the patient's independence. Please see assessment section for further patient specific details. If patient discharges prior to next session this note will serve as a discharge summary. Please see below for the latest assessment towards goals. PT Equipment Recommendations  Equipment Needed: No  Other: defer to next level of care    Assessment   Body structures, Functions, Activity limitations: Decreased functional mobility ; Decreased balance;Decreased cognition  Assessment: Pt demos improved sitting tolerance today. Requires 2 person assist for bed mobility due to generalized stiffness, weakness, and poor command following. Pt appears to be below his functional baseline. Rec continued IP PT. Will follow  Treatment Diagnosis: Decreased functional mobility  Prognosis: Fair  PT Education: PT Role;General Safety;Orientation  Patient Education: Pt will need re-education  REQUIRES PT FOLLOW UP: Yes     Patient Diagnosis(es): The primary encounter diagnosis was Encephalopathy. A diagnosis of Urinary tract infection without hematuria, site unspecified was also pertinent to this visit. has a past medical history of Bilateral pneumonia and BPH (benign prostatic hyperplasia). has no past surgical history on file.     Restrictions  Restrictions/Precautions  Restrictions/Precautions: Up as Tolerated  Position Activity Restriction  Other position/activity PARTIALLY MET 1/3  Short term goal 4: Pt will transfer sit <--> stand with mod A x 2 and achieve near full stance  Short term goal 5: Pt will participate in bed <--> chair transfer assessment    Plan    Plan  Times per week: 2-5  Current Treatment Recommendations: Functional Mobility Training,Balance Training,Gait Training,Transfer Training,Safety Education & Training,Strengthening,ROM,Patient/Caregiver Education & Training  Safety Devices  Type of devices: Call light within reach,Nurse notified,Left in bed,Bed alarm in place     Therapy Time   Individual Concurrent Group Co-treatment   Time In 1430         Time Out 1508         Minutes 38                 Timed Code Treatment Minutes:  38    Total Treatment Minutes:  38    If patient is discharged prior to next treatment, this note will serve as the discharge summary.   Nathaniel Quispe, PT, DPT  [id number]

## 2022-01-03 NOTE — PLAN OF CARE
Problem: Falls - Risk of:  Goal: Will remain free from falls  Description: Will remain free from falls  1/2/2022 2150 by Ye Mas RN  Outcome: Ongoing  1/2/2022 0939 by Shimon Westfall RN  Outcome: Ongoing  Goal: Absence of physical injury  Description: Absence of physical injury  Outcome: Ongoing     Pt high fall risk. Instructed to use call light before getting out of bed. Call light within reach. Bed in low position. Bed alarm on.

## 2022-01-03 NOTE — PROGRESS NOTES
Progress Note    Admit Date: 12/30/2021  Diet: Diet NPO Exceptions are: Sips of Water with Meds    CC: AMS    Interval history: COVID positive  Continues to say a few words but is incomprehensible. Does not appear to be in distress. Still in afib, tachy 100, no other issues, has not passed swallow eval will give NG for supplementation and meds    Medications:     Scheduled Meds:   potassium chloride  10 mEq IntraVENous Q1H    thiamine (VITAMIN B1) IVPB  100 mg IntraVENous Q24H    [Held by provider] lisinopril  5 mg Oral Daily    [Held by provider] tamsulosin  0.4 mg Oral Daily    sodium chloride flush  5-40 mL IntraVENous 2 times per day    enoxaparin  40 mg SubCUTAneous Daily    cefTRIAXone (ROCEPHIN) IV  1,000 mg IntraVENous Q24H     Continuous Infusions:   dilTIAZem 2.5 mg/hr (01/03/22 0955)    sodium chloride       PRN Meds:labetalol, sodium chloride flush, sodium chloride, ondansetron **OR** ondansetron, polyethylene glycol, acetaminophen **OR** acetaminophen    Objective:   Vitals:   T-max:  Patient Vitals for the past 8 hrs:   BP Temp Temp src Pulse Resp SpO2   01/03/22 0935    93  93 %   01/03/22 0740 (!) 136/95 98.7 °F (37.1 °C) Axillary 87 22 94 %   01/03/22 0623    78     01/03/22 0535    94     01/03/22 0437 129/85 99.1 °F (37.3 °C) Axillary 90 22 93 %       Intake/Output Summary (Last 24 hours) at 1/3/2022 1046  Last data filed at 1/3/2022 0935  Gross per 24 hour   Intake 1806.58 ml   Output 1250 ml   Net 556.58 ml     Review of Systems   Unable to perform ROS: Mental status change       Physical Exam  Constitutional:       General: He is not in acute distress. Appearance: He is ill-appearing. He is not toxic-appearing. Comments: thin   HENT:      Head: Normocephalic. Right Ear: External ear normal.      Left Ear: External ear normal.      Nose: Nose normal.      Mouth/Throat:      Mouth: Mucous membranes are dry. Pharynx: Oropharynx is clear.  No oropharyngeal exudate or posterior oropharyngeal erythema. Eyes:      General:         Right eye: No discharge. Left eye: No discharge. Extraocular Movements: Extraocular movements intact. Conjunctiva/sclera: Conjunctivae normal.   Cardiovascular:      Rate and Rhythm: Tachycardia present. Rhythm irregular. Pulses: Normal pulses. Heart sounds: No murmur heard. Pulmonary:      Effort: Pulmonary effort is normal. No respiratory distress. Breath sounds: No wheezing or rales. Abdominal:      General: There is no distension. Palpations: Abdomen is soft. Tenderness: There is no abdominal tenderness. There is no guarding. Musculoskeletal:         General: No swelling or tenderness. Normal range of motion. Cervical back: Normal range of motion. No rigidity. Skin:     General: Skin is warm. Coloration: Skin is not jaundiced or pale. Neurological:      General: No focal deficit present. Mental Status: He is alert. Comments: Disoriented  Speech is difficult to comprehend       LABS:    CBC:   Recent Labs     01/01/22  0501 01/02/22 0443 01/03/22  0435   WBC 7.3 8.3 7.2   HGB 12.8* 13.9 14.0   HCT 38.1* 41.6 41.1    171 173   MCV 90.7 91.2 90.9     Renal:    Recent Labs     01/01/22  0501 01/02/22 0443 01/03/22  0435    134* 136   K 3.5 3.5 3.1*    99 99   CO2 28 25 28   BUN 19 16 16   CREATININE <0.5* <0.5* <0.5*   GLUCOSE 100* 131* 123*   CALCIUM 8.1* 7.9* 7.8*   MG 1.70* 2.10 2.00   PHOS  --  2.3* 2.2*   ANIONGAP 5 10 9     Hepatic:   Recent Labs     01/02/22  0443 01/03/22  0435   LABALBU 3.3* 3.2*     Troponin:   No results for input(s): TROPONINI in the last 72 hours. -----------------------------------------------------------------  RAD:   CT Head WO Contrast   Final Result      Cerebral atrophy. Evidence of significant diffuse chronic small vessel white matter disease bilaterally. No acute intracranial findings.             XR

## 2022-01-03 NOTE — PLAN OF CARE
Problem: Falls - Risk of:  Goal: Will remain free from falls  Description: Will remain free from falls  1/3/2022 0749 by Rosemary Allen RN  Outcome: Ongoing    Bed in lowest position with all wheels locked. Rails up x3. Bed alarm in use. Belongings and call light in reach. Non skid footwear in use. Problem: Skin Integrity:  Goal: Absence of new skin breakdown  Description: Absence of new skin breakdown  1/3/2022 0749 by Cedric Westfall RN  Outcome: Ongoing    Pt turne q2 hours and assessed for incontinence. Skin assessment completed q shift.

## 2022-01-03 NOTE — CONSULTS
[facility]  Palliative Medicine Consultation Note      Date Of Admission:12/30/2021  Date of consult: 01/03/22  Seen by [facility] in the past: No    Recommendations:        Called and spoke with the patient's wife Terri Ma, daughter Makayla Flood, son Rene Hall, and Joey's wife Sonia. Introduced palliative care and talked about his current clinical course and the timeline of what brought the patient to the hospital. Per collateral, prior to this admission the patient was functionally more independent. Patient has a history of chronically being underweight but has lost an additional weight since his new-onset mental decline. Given his weight issues, we discussed strategies to supplement his caloric intake. We discussed that an NG tube was attempted however this placement was unsuccessful. We also discussed the possibility of PEG tube placement in the future to which the family was receptive however the children do not know their father's specific wishes in relation to this. From our discussion and review of the patient's chart it does appear that the patient has had some issues with his memory as early as August of 2021 however the family is in agreement that there has been a semi-acute decline recently. Emotional support provided and questions about his clinical status were addressed. Addendum: Conducted a FaceTime visit with pt and his daughter Makayla Flood and son Rene Hall. The pt was able to communicate minimally with his children. Rene Hall and Makayla Flood state that the family is interested in hospice, and plan on speaking about it further when one of Amanda's children is able to be present with her tomorrow morning. Explained the hospice philosophy and services, and offered choice of hospice. They prefer the [facility].      1. Goals of Care/Advanced Care planning/Code status: Terri Ma (Pt's wife) is the primary decision maker and there is an alternate decision maker Makayla Flood (the patient's daughter). Pateint is Limited x3 (No to CPR, Defibrillation, and Intubation). 2. Pain: Patient has endorsed some pain in his legs from movement which may be a chronic issue, however he does not appear to be in discomfort at baseline. 3. SOB: No evidence of SOB or breathing difficulties on clinical exam. Sating well on room air. 4. Malnutrition: family reports pt's usual weight is 130#, and that he has always been thin. Per chart, pt weighed 125# in 2/2020. He has lost about 9# in the past 2 years, current BMI is 15.8. Pt is NPO per SLP recommendations. NG placement failed. Family is leaning against PEG at this point. 5. Disposition: TBD contingent on the possibility of finding a reversible cause for his encephalopathy. Family is interested in hospice. Reason for Consult:         [x]  Goals of Care  [x]  Code Status Discussion/Advanced Care Planning   [x]  Psychosocial/Family Support  []  Symptom Management  []  Other (Specify)    Requesting Physician: Dr. Azar Del Valle: generalized weakness    History Obtained From:  patient, spouse, electronic medical record    History of Present Illness:         Mark Burton is a 80 y.o. male with PMH of HTN and BPH who ws brought into the ED via his wife for ongoing concerns about his declining mental status. Per the wife, the patient began to have memory difficulties beginning last month along with new episodes of increased somnolence and decreased ability to tolerate his ADLs. Patient was previously able to drive and interact appropriately. When evaluated in the emergency department the patient could only answer limited questions and was noted to be disoriented. The patient was empirically treated for a UTI for hopes of improving his mentation along with a work-up for reversible causes of encephalopathy. Despite these treatments, his mentation has not yet improved nor have we located an causative etiology for his AMS.  Palliative care was consulted to discuss goals of care, code status, and to rendezvous with family for assistance in decision making. Subjective:         Past Medical History:        Diagnosis Date    Bilateral pneumonia 11/2017    BPH (benign prostatic hyperplasia)        Past Surgical History:    History reviewed. No pertinent surgical history. Current Medications:    Medications Prior to Admission: lisinopril (PRINIVIL;ZESTRIL) 5 MG tablet, TAKE 1 TABLET BY MOUTH EVERY DAY  tamsulosin (FLOMAX) 0.4 MG capsule, NEEDS APPOINTMENT - TAKE ONE TABLET BY MOUTH DAILY  Multiple Vitamin (MULTIVITAMIN PO), Take  by mouth. Allergies:  Patient has no known allergies. Social History:    · TOBACCO: reports that he has never smoked. He has never used smokeless tobacco.  · ETOH:   reports no history of alcohol use. · DRUGS :  No reported use  · Patient currently lives at home with family: Wife     Review of Systems -   Review of Systems   Unable to perform ROS: Mental status change       Objective:        PHYSICAL EXAM:   Physical Exam  Constitutional:       General: He is not in acute distress. Appearance: Normal appearance. He is not ill-appearing. HENT:      Head: Normocephalic and atraumatic. Right Ear: External ear normal.      Left Ear: External ear normal.      Nose: Nose normal.   Pulmonary:      Effort: Pulmonary effort is normal.   Abdominal:      General: Abdomen is flat. Palpations: Abdomen is soft. Musculoskeletal:         General: Normal range of motion. Skin:     General: Skin is warm and dry. Neurological:      Mental Status: He is disoriented. Psychiatric:      Comments: Difficult to assess 2/2 above        Palliative Performance Scale:  [] 60% Ambulation reduced; Significant disease; Can't do hobbies/housework; intake normal or reduced; occasional assist; LOC full/confusion  [] 50% Mainly sit/lie;  Extensive disease; Can't do any work; Considerable assist; intake normal  Or reduced; LOC full/confusion  [] 40% Mainly in bed; Extensive disease; Mainly assist; intake normal or reduced; occasional assist; LOC full/confusion  [] 30% Bed Bound; Extensive disease; Total care; intake reduced; LOC full/confusion  [] 20% Bed Bound; Extensive disease; Total care; intake minimal; Drowsy/coma  [] 10% Bed Bound; Extensive disease; Total care; Mouth care only; Drowsy/coma  [] 0% Death    PPS:     Vitals:    /88   Pulse 87   Temp 98.6 °F (37 °C) (Axillary)   Resp 22   Ht 6' (1.829 m)   Wt 116 lb 2.9 oz (52.7 kg)   SpO2 93%   BMI 15.76 kg/m²     Labs:    BMP:   Recent Labs     01/01/22  0501 01/02/22 0443 01/03/22  0435    134* 136   K 3.5 3.5 3.1*    99 99   CO2 28 25 28   BUN 19 16 16   CREATININE <0.5* <0.5* <0.5*   GLUCOSE 100* 131* 123*     CBC:   Recent Labs     01/01/22  0501 01/02/22 0443 01/03/22  0435   WBC 7.3 8.3 7.2   HGB 12.8* 13.9 14.0   HCT 38.1* 41.6 41.1    171 173       LFT's: No results for input(s): AST, ALT, ALB, BILITOT, ALKPHOS in the last 72 hours. Troponin: No results for input(s): TROPONINI in the last 72 hours. BNP: No results for input(s): BNP in the last 72 hours. ABGs: No results for input(s): PHART, QLF8YPN, PO2ART in the last 72 hours. INR: No results for input(s): INR in the last 72 hours. U/A:No results for input(s): NITRITE, COLORU, PHUR, LABCAST, WBCUA, RBCUA, MUCUS, TRICHOMONAS, YEAST, BACTERIA, CLARITYU, SPECGRAV, LEUKOCYTESUR, UROBILINOGEN, BILIRUBINUR, BLOODU, GLUCOSEU, AMORPHOUS in the last 72 hours. Invalid input(s): KETONESU    CT Head WO Contrast   Final Result      Cerebral atrophy. Evidence of significant diffuse chronic small vessel white matter disease bilaterally. No acute intracranial findings. XR CHEST PORTABLE   Final Result      No acute cardiopulmonary findings.          MRI BRAIN W WO CONTRAST    (Results Pending)       Conclusion/Time spent:         Recommendations see above    Pt [id number], Family [phone]  Time spent with patient and/or family: 60 min  Time reviewing records: 10 min   Time communicating with staff: 5 min     A total of 75 minutes spent with the patient and family on unit greater than 50% in counseling regarding palliative care and in goals of care for the patient. Thank you to Dr. Kira Lawler for this consultation. We will continue to follow Mr. Burton's care as needed.     Estella Henderson NP  [address] [place] [address]

## 2022-01-03 NOTE — PROGRESS NOTES
Attempted to place NG tube per orders. Pt unable to follow commands. NG tube insertion was unsuccessful. MD notified.

## 2022-01-03 NOTE — FLOWSHEET NOTE
01/03/22 1454   Encounter Summary   Services provided to: Family  (wife)   Referral/Consult From: Family   Continue Visiting   (es 1/3 acp and pastoral)   Complexity of Encounter High   Length of Encounter 30 minutes   Advance Care Planning Yes   Routine   Type Initial   Assessment Approachable   Intervention Active listening;Explored feelings, thoughts, concerns;Explored coping resources;Prayer;Discussed illness/injury and it's impact   Outcome Engaged in conversation;Receptive;Venting emotion   Primary Decision Maker (Healthcare Proxy)   [address] [facility] is: Legal Next of Mary[address]ruz 69  (wife Javan Chang [phone])   acp conversation by phone with patient's wife. Details in acp note.

## 2022-01-04 LAB
ALBUMIN SERPL-MCNC: 2.9 G/DL (ref 3.4–5)
ANION GAP SERPL CALCULATED.3IONS-SCNC: 9 MMOL/L (ref 3–16)
BUN BLDV-MCNC: 15 MG/DL (ref 7–20)
CALCIUM SERPL-MCNC: 8.1 MG/DL (ref 8.3–10.6)
CHLORIDE BLD-SCNC: 98 MMOL/L (ref 99–110)
CO2: 29 MMOL/L (ref 21–32)
CREAT SERPL-MCNC: <0.5 MG/DL (ref 0.8–1.3)
GFR AFRICAN AMERICAN: >60
GFR NON-AFRICAN AMERICAN: >60
GLUCOSE BLD-MCNC: 88 MG/DL (ref 70–99)
HCT VFR BLD CALC: 40.6 % (ref 40.5–52.5)
HEMOGLOBIN: 13.7 G/DL (ref 13.5–17.5)
MAGNESIUM: 1.9 MG/DL (ref 1.8–2.4)
MCH RBC QN AUTO: 30.4 PG (ref 26–34)
MCHC RBC AUTO-ENTMCNC: 33.7 G/DL (ref 31–36)
MCV RBC AUTO: 90.4 FL (ref 80–100)
PDW BLD-RTO: 12.9 % (ref 12.4–15.4)
PHOSPHORUS: 2.8 MG/DL (ref 2.5–4.9)
PLATELET # BLD: 176 K/UL (ref 135–450)
PMV BLD AUTO: 8.7 FL (ref 5–10.5)
POTASSIUM SERPL-SCNC: 3.1 MMOL/L (ref 3.5–5.1)
RBC # BLD: 4.49 M/UL (ref 4.2–5.9)
SODIUM BLD-SCNC: 136 MMOL/L (ref 136–145)
WBC # BLD: 5.7 K/UL (ref 4–11)

## 2022-01-04 PROCEDURE — 99232 SBSQ HOSP IP/OBS MODERATE 35: CPT | Performed by: HOSPITALIST

## 2022-01-04 PROCEDURE — 2580000003 HC RX 258: Performed by: STUDENT IN AN ORGANIZED HEALTH CARE EDUCATION/TRAINING PROGRAM

## 2022-01-04 PROCEDURE — 36415 COLL VENOUS BLD VENIPUNCTURE: CPT

## 2022-01-04 PROCEDURE — 80069 RENAL FUNCTION PANEL: CPT

## 2022-01-04 PROCEDURE — 85027 COMPLETE CBC AUTOMATED: CPT

## 2022-01-04 PROCEDURE — 2060000000 HC ICU INTERMEDIATE R&B

## 2022-01-04 PROCEDURE — 83735 ASSAY OF MAGNESIUM: CPT

## 2022-01-04 PROCEDURE — 99232 SBSQ HOSP IP/OBS MODERATE 35: CPT | Performed by: NURSE PRACTITIONER

## 2022-01-04 PROCEDURE — 6360000002 HC RX W HCPCS: Performed by: STUDENT IN AN ORGANIZED HEALTH CARE EDUCATION/TRAINING PROGRAM

## 2022-01-04 PROCEDURE — 2500000003 HC RX 250 WO HCPCS: Performed by: STUDENT IN AN ORGANIZED HEALTH CARE EDUCATION/TRAINING PROGRAM

## 2022-01-04 PROCEDURE — 6360000002 HC RX W HCPCS: Performed by: HOSPITALIST

## 2022-01-04 RX ORDER — DEXTROSE AND SODIUM CHLORIDE 5; .45 G/100ML; G/100ML
INJECTION, SOLUTION INTRAVENOUS CONTINUOUS
Status: DISCONTINUED | OUTPATIENT
Start: 2022-01-04 | End: 2022-01-05 | Stop reason: HOSPADM

## 2022-01-04 RX ORDER — POTASSIUM CHLORIDE 7.45 MG/ML
10 INJECTION INTRAVENOUS
Status: DISPENSED | OUTPATIENT
Start: 2022-01-04 | End: 2022-01-04

## 2022-01-04 RX ORDER — LORAZEPAM 2 MG/ML
1 CONCENTRATE ORAL EVERY 4 HOURS PRN
Qty: 30 ML | Refills: 0 | Status: SHIPPED | OUTPATIENT
Start: 2022-01-04 | End: 2022-01-18

## 2022-01-04 RX ORDER — POTASSIUM CHLORIDE 7.45 MG/ML
10 INJECTION INTRAVENOUS ONCE
Status: COMPLETED | OUTPATIENT
Start: 2022-01-04 | End: 2022-01-04

## 2022-01-04 RX ORDER — MORPHINE SULFATE 20 MG/5ML
2.5 SOLUTION ORAL
Qty: 100 ML | Refills: 0 | Status: SHIPPED | OUTPATIENT
Start: 2022-01-04 | End: 2022-01-07

## 2022-01-04 RX ADMIN — POTASSIUM CHLORIDE 10 MEQ: 10 INJECTION, SOLUTION INTRAVENOUS at 15:31

## 2022-01-04 RX ADMIN — DEXTROSE AND SODIUM CHLORIDE: 5; 450 INJECTION, SOLUTION INTRAVENOUS at 11:34

## 2022-01-04 RX ADMIN — THIAMINE HYDROCHLORIDE 100 MG: 100 INJECTION, SOLUTION INTRAMUSCULAR; INTRAVENOUS at 12:46

## 2022-01-04 RX ADMIN — SODIUM CHLORIDE, PRESERVATIVE FREE 10 ML: 5 INJECTION INTRAVENOUS at 09:04

## 2022-01-04 RX ADMIN — ENOXAPARIN SODIUM 40 MG: 100 INJECTION SUBCUTANEOUS at 09:03

## 2022-01-04 RX ADMIN — DILTIAZEM HYDROCHLORIDE 5 MG/HR: 5 INJECTION INTRAVENOUS at 17:35

## 2022-01-04 RX ADMIN — SODIUM CHLORIDE 25 ML: 9 INJECTION, SOLUTION INTRAVENOUS at 12:45

## 2022-01-04 RX ADMIN — POTASSIUM CHLORIDE 10 MEQ: 10 INJECTION, SOLUTION INTRAVENOUS at 14:23

## 2022-01-04 RX ADMIN — SODIUM CHLORIDE 25 ML: 9 INJECTION, SOLUTION INTRAVENOUS at 15:15

## 2022-01-04 RX ADMIN — SODIUM CHLORIDE 25 ML: 9 INJECTION, SOLUTION INTRAVENOUS at 14:22

## 2022-01-04 RX ADMIN — POTASSIUM CHLORIDE 10 MEQ: 10 INJECTION, SOLUTION INTRAVENOUS at 11:50

## 2022-01-04 RX ADMIN — SODIUM CHLORIDE 25 ML: 9 INJECTION, SOLUTION INTRAVENOUS at 10:22

## 2022-01-04 RX ADMIN — POTASSIUM CHLORIDE 10 MEQ: 10 INJECTION, SOLUTION INTRAVENOUS at 10:24

## 2022-01-04 ASSESSMENT — PAIN SCALES - PAIN ASSESSMENT IN ADVANCED DEMENTIA (PAINAD)
CONSOLABILITY: 0
CONSOLABILITY: 0
NEGVOCALIZATION: 0
BREATHING: 0
TOTALSCORE: 0
FACIALEXPRESSION: 0
NEGVOCALIZATION: 0
TOTALSCORE: 0
NEGVOCALIZATION: 0
FACIALEXPRESSION: 0
CONSOLABILITY: 0
BREATHING: 0
BREATHING: 0
TOTALSCORE: 0
BREATHING: 0
CONSOLABILITY: 0
NEGVOCALIZATION: 0
CONSOLABILITY: 0
TOTALSCORE: 0
BODYLANGUAGE: 0
FACIALEXPRESSION: 0
BODYLANGUAGE: 0
NEGVOCALIZATION: 0
TOTALSCORE: 0
TOTALSCORE: 0
NEGVOCALIZATION: 0
FACIALEXPRESSION: 0
BREATHING: 0
BODYLANGUAGE: 0
NEGVOCALIZATION: 0
NEGVOCALIZATION: 0
FACIALEXPRESSION: 0
BREATHING: 0
BREATHING: 0
CONSOLABILITY: 0
NEGVOCALIZATION: 0
CONSOLABILITY: 0
CONSOLABILITY: 0
BODYLANGUAGE: 0
BODYLANGUAGE: 0
TOTALSCORE: 0
TOTALSCORE: 0
CONSOLABILITY: 0
BREATHING: 0
BODYLANGUAGE: 0
NEGVOCALIZATION: 0
NEGVOCALIZATION: 0
FACIALEXPRESSION: 0
CONSOLABILITY: 0
BODYLANGUAGE: 0
FACIALEXPRESSION: 0
BREATHING: 0
TOTALSCORE: 0
FACIALEXPRESSION: 0
BODYLANGUAGE: 0
BREATHING: 0
TOTALSCORE: 0
BODYLANGUAGE: 0
BREATHING: 0
TOTALSCORE: 0
BODYLANGUAGE: 0
CONSOLABILITY: 0
FACIALEXPRESSION: 0
NEGVOCALIZATION: 0
BODYLANGUAGE: 0
FACIALEXPRESSION: 0
BODYLANGUAGE: 0
BREATHING: 0
TOTALSCORE: 0
CONSOLABILITY: 0

## 2022-01-04 ASSESSMENT — PAIN SCALES - GENERAL
PAINLEVEL_OUTOF10: 0

## 2022-01-04 NOTE — PROGRESS NOTES
Progress Note    Admit Date: 12/30/2021  Diet: Diet NPO Exceptions are: Sips of Water with Meds    CC: AMS    Interval history: Shows improvement in his mentation. He is slow to respond but able to eventually answer all questions. Speech is garbled however. A&O x3 and can state his name, age, time, but is not oriented towards location and situation. Medications:     Scheduled Meds:   potassium chloride  10 mEq IntraVENous Q1H    thiamine (VITAMIN B1) IVPB  100 mg IntraVENous Q24H    [Held by provider] lisinopril  5 mg Oral Daily    [Held by provider] tamsulosin  0.4 mg Oral Daily    sodium chloride flush  5-40 mL IntraVENous 2 times per day    enoxaparin  40 mg SubCUTAneous Daily     Continuous Infusions:   dextrose 5 % and 0.45 % NaCl 75 mL/hr at 01/04/22 1134    dilTIAZem 7.5 mg/hr (01/04/22 0349)    sodium chloride 25 mL (01/04/22 1022)     PRN Meds:labetalol, sodium chloride flush, sodium chloride, ondansetron **OR** ondansetron, polyethylene glycol, acetaminophen **OR** acetaminophen    Objective:   Vitals:   T-max:  Patient Vitals for the past 8 hrs:   BP Temp Temp src Pulse Resp SpO2   01/04/22 0741 (!) 141/89 98.7 °F (37.1 °C) Axillary 86 20 95 %   01/04/22 0345 131/88 99.2 °F (37.3 °C) Axillary 95 22 93 %       Intake/Output Summary (Last 24 hours) at 1/4/2022 1143  Last data filed at 1/4/2022 1016  Gross per 24 hour   Intake 0 ml   Output 2800 ml   Net -2800 ml     Review of Systems   Unable to perform ROS: Mental status change       Physical Exam  Constitutional:       General: He is not in acute distress. Appearance: He is ill-appearing. He is not toxic-appearing. Comments: thin   HENT:      Head: Normocephalic. Right Ear: External ear normal.      Left Ear: External ear normal.      Nose: Nose normal.      Mouth/Throat:      Mouth: Mucous membranes are dry. Pharynx: Oropharynx is clear. No oropharyngeal exudate or posterior oropharyngeal erythema.    Eyes:      General: Right eye: No discharge. Left eye: No discharge. Extraocular Movements: Extraocular movements intact. Conjunctiva/sclera: Conjunctivae normal.   Cardiovascular:      Rate and Rhythm: Tachycardia present. Rhythm irregular. Pulses: Normal pulses. Heart sounds: No murmur heard. Pulmonary:      Effort: Pulmonary effort is normal. No respiratory distress. Breath sounds: No wheezing or rales. Abdominal:      General: There is no distension. Palpations: Abdomen is soft. Tenderness: There is no abdominal tenderness. There is no guarding. Musculoskeletal:         General: No swelling or tenderness. Normal range of motion. Cervical back: Normal range of motion. No rigidity. Skin:     General: Skin is warm. Coloration: Skin is not jaundiced or pale. Neurological:      General: No focal deficit present. Mental Status: He is alert. Comments: Disoriented  Speech is difficult to comprehend       LABS:    CBC:   Recent Labs     01/02/22 0443 01/03/22 0435 01/04/22 0437   WBC 8.3 7.2 5.7   HGB 13.9 14.0 13.7   HCT 41.6 41.1 40.6    173 176   MCV 91.2 90.9 90.4     Renal:    Recent Labs     01/02/22 0443 01/03/22 0435 01/04/22 0437   * 136 136   K 3.5 3.1* 3.1*   CL 99 99 98*   CO2 25 28 29   BUN 16 16 15   CREATININE <0.5* <0.5* <0.5*   GLUCOSE 131* 123* 88   CALCIUM 7.9* 7.8* 8.1*   MG 2.10 2.00 1.90   PHOS 2.3* 2.2* 2.8   ANIONGAP 10 9 9     Hepatic:   Recent Labs     01/02/22 0443 01/03/22 0435 01/04/22 0437   LABALBU 3.3* 3.2* 2.9*     Troponin:   No results for input(s): TROPONINI in the last 72 hours. -----------------------------------------------------------------  RAD:   CT Head WO Contrast   Final Result      Cerebral atrophy. Evidence of significant diffuse chronic small vessel white matter disease bilaterally. No acute intracranial findings.             XR CHEST PORTABLE   Final Result      No acute cardiopulmonary findings. MRI BRAIN W WO CONTRAST    (Results Pending)         Assessment/Plan:     Acute metabolic encephalopathy likely 2/2 UTI  -Consider other etiology given AMS has been on going for a month. Will treat UTI first.   - Continue IV ceftriaxone 5 days (12/30-1/3)   - PT/OT for weakness  - Will likely benefit from neuropsych eval if no improvement on treatment of UTI  - F/U MRI w/wo contrast eval other etiology.   - NG tube unable to be placed by nursing today. Will try to place another NG tube versus discussion of PEG tube. - Will start on D5 with half-normal saline. Paroxsymal AFIB with RVR  - Rate has been in the 100s  -Increased dilt drip to 10. Once able to take pills will convert to p.o.  - ECHO when out of isolation    COVID 19 infection (stable)  - does not show signs of pneumonia at this time  - does not meet criteria for steroids, immune suppressants  - if worsens start steroids and check inflammatory markers    Malnourishment  - significant dysphagia, NPO  - dietary following  - SLP  Continues to follow    Chronic issues:   HTN - Continue home lisinopril - held while NPO   BPH - Continue home tamsulosin - held while NPO     Code Status: limited  FEN: NPO 2/2 swallow issues. Hospice to eval for goals of care  PPX: lovenox  DISPO: [facility] possibly hospice pending conversation with family    Ian Carpio DO, PGY-1  01/04/22  11:43 AM    This patient has will be staffed and discussed with Wanda Mittal MD.    Addendum to Resident H& P/Progress note:  I have personally seen,examined and evaluated the patient.  I have reviewed the current history, physical findings, labs and assessment and plan and agree with note as documented by resident MD ( Alex Ashby)  Family members will have meeting with the representative from hospice of [place] today at 3:30 PM    Wanda Mittal MD, Lisa Gonzalez

## 2022-01-04 NOTE — PLAN OF CARE
Problem: Falls - Risk of:  Goal: Will remain free from falls  Description: Will remain free from falls  Outcome: Met This Shift  Note: Extremely weak and lethargic. Fall precautions in place. No attempts to exit bed without assistance this shift. Problem: Skin Integrity:  Goal: Will show no infection signs and symptoms  Description: Will show no infection signs and symptoms  Outcome: Met This Shift  Note: Bed rest. Q2 turn. No new signs and symptoms of infections or wounds. Problem: Pain:  Goal: Patient's pain/discomfort is manageable  Description: Patient's pain/discomfort is manageable  Outcome: Met This Shift  Note: No complaints of pain this shift.       Problem: Airway Clearance - Ineffective  Goal: Achieve or maintain patent airway  Outcome: Met This Shift  Note: Patient has remained on RA this shift, sitting in low to mid 90's o2

## 2022-01-04 NOTE — PROGRESS NOTES
Comprehensive Nutrition Assessment  RD did not conduct direct, in-person nutrition evaluation in efforts to reduce exposure and use of PPE for high risk persons, PUI persons, patients who have tested positive for Covid-19 or those awaiting respiratory panel results. EMR was screened for nutrition risk factors, as defined per nutrition standards of care. Nutrition history and assessment obtained through RD EMR review and RN report. RECOMMENDATIONS:  1. Continue NPO at this time. 2. If aggressive nutrition intervention desired, please consult RD for EN rec'd, should family decide feeding tube for alternative nutrition. NUTRITION ASSESSMENT:   Type and Reason for Visit:  Type and Reason for Visit: Reassess   Nutritional summary & status: Pt remains NPO now x 5 days (entire admission). RD noted palliative care consult re: goals of care. Attempted NGT w/out success and pt discussing transition to hospice care vs PEG at this time. RD rohit for severity of malnutrition, thus pt deemed at risk with no evidence of recent significant weight loss per EMR, only NPO x 5 days. RD continues to monitor for goals of care. Patient admitted d/t acute metabolic [id number] VSRARS 0/9 UTI w/AMS, generalized weakness and malnutrition per MD. Found to be COVID+ upon admit. PMH significant for: HTN, BPH    MALNUTRITION ASSESSMENT  Context of Malnutrition: Acute Illness   Malnutrition Status:  At risk for malnutrition (Comment) - likely severe but ROHIT d/t COVID isolation  Findings of the 6 clinical characteristics of malnutrition (Minimum of 2 out of 6 clinical characteristics is required to make the diagnosis of moderate or severe Protein Calorie Malnutrition based on AND/ASPEN Guidelines):  Energy Intake: Less than/equal to 50% of estimated energy requirements    Energy Intake Time: Greater than or equal to 5 days      NUTRITION DIAGNOSIS   · Inadequate oral intake related to cognitive or neurological impairment as evidenced by swallow study results,NPO or clear liquid status due to medical condition    [address] [facility] [address] and/or Nutrient Delivery:  Continue NPO  Nutrition Education/Counseling:  No recommendation at this time   Goals:  pt will tolerate most apporopriate form of nutrition to support pt desired level of POC. Nutrition Monitoring and Evaluation:   Food/Nutrient Intake Outcomes:  Diet Advancement/Tolerance  Physical Signs/Symptoms Outcomes:  Biochemical Data,Weight     OBJECTIVE DATA: Significant to nutrition assessment  · Nutrition-Focused Physical Findings: Nutrition Related Findings: lbm 1/3;    · Labs: Reviewed;   · Meds: Reviewed;   · Wounds: Wound Type: None     CURRENT NUTRITION THERAPIES  Diet NPO Exceptions are: Sips of Water with Meds     Additional Calorie Sources:   306 kcal from D5  PO Intake: Average Meal Intake: NPO   PO Supplement Intake:Average Supplements Intake: NPO  IVF: D5 1/2 NS @  75 ml/hr     ANTHROPOMETRICS  Current Height: 6' (182.9 cm)  Current Weight: 116 lb 2.9 oz (52.7 kg)    Admission weight: 116 lb 2.9 oz (52.7 kg)  Ideal Body Weight (lbs) (Calculated): 178 lbs (Ideal Body Weight (Kg) (Calculated): 81 kg)  Usual Bodyweight: per -120#  Weight Changes: no significant per EMR;       BMI BMI (Calculated): 15.8    Wt Readings from Last 50 Encounters:   12/31/21 116 lb 2.9 oz (52.7 kg)   09/20/21 115 lb (52.2 kg)   09/13/21 115 lb (52.2 kg)   08/30/21 117 lb (53.1 kg)       COMPARATIVE STANDARDS  Energy (kcal):  ~0118-9226 (30-35); Weight Used for Energy Requirements:  Admission (52.7)     Protein (g):  79-94 (1.5-1.8); Weight Used for Protein Requirements:  Admission        Fluid (ml/day):  0485-6498 ; Method Used for Fluid Requirements:  1 ml/kcal      The patient will still be monitored per nutrition standards of care. Consult dietitian if nutrition interventions essential to patient care is needed.      Shelley Houston RD, LD  [place]: [phone]  Office:  [phone]

## 2022-01-04 NOTE — DISCHARGE SUMMARY
INTERNAL MEDICINE DEPARTMENT AT [address] [place] [address]  DISCHARGE SUMMARY    Patient ID: Rony Burton                                             Discharge Date: 1/5/2022   Patient's PCP: Natalya Zamora MD                                          Discharge Physician: Helga Vaz DO   Admit Date: 12/30/2021   Admitting Physician: Natalya Zamora MD    PROBLEMS DURING HOSPITALIZATION:  Present on Admission:   Acute metabolic encephalopathy   Urinary tract infection without hematuria   Essential hypertension   Moderate malnutrition ([address]   Underweight   Rapid atrial fibrillation (HCC)   Hypomagnesemia   Dysarthria   Hypokalemia      DISCHARGE DIAGNOSES:    HPI:   Mark Burton is a 80 y.o male who presented to the ED with confusion, generalized weakness and fatigue.  PMH includes HTN and BPH. Per her wife patient started having increase memory issues starting 1 month ago. Prior to that was independent and was able to drive and do activities of daily living without any issues. Only had minor cognitive deficits. No concerns for Alzheimer's. Within the last month mentation has been declining. Wife had noticed that patient had been sleeping more during the day which is atypical for the patient. Does not snore during the day. Wife states that patient has not complained of any medical issues. She states that he has not complained of any abdominal pains or any dysuria, fevers, chills. On 12/27 was able to drive with no issues. Wife brought patient to the hospital on 12/30/2021 secondary to worsening cognitive deficits.     On evaluation in the ED he was able to state his full name and denies any shortness of breath or chest pain but was unable to respond to anything else. Was admitted to the hospital for AMS likely secondary to a UTI. On evaluation on 12/31 patient did say \" shoot may shoot me\" but did not further elaborate after that. Treated for UTI and showed no worsening of symptoms on labs.  Mental function did not have any improvement and continued to have swallowing issues. Palliative care was consulted given his status. . Discussed with family option of hospice care    The following issues were addressed during hospitalization:    Acute metabolic encephalopathy   - Now unable to swallow with poor mentation will plan for hospice care. UTI:   -Completed antibiotics course     Malnourishement:   - continue current diet.      Chronic issues:   HTN - can discontinue for hospice  BPH - Can continue but defer to hospice      Physical Exam:  /70   Pulse 79   Temp 97.9 °F (36.6 °C) (Axillary)   Resp 16   Ht 6' (1.829 m)   Wt 116 lb 2.9 oz (52.7 kg)   SpO2 94%   BMI 15.76 kg/m²       Consults: palliative  Significant Diagnostic Studies:  CT scan head  Treatments: antibiotics: ceftriaxone  Disposition: hospice  Discharged Condition: Stable  Follow Up: Primary Care Physcian: N/A    DISCHARGE MEDICATION:     Medication List      START taking these medications    LORazepam 2 MG/ML concentrated solution  Commonly known as: ATIVAN  Take 0.5 mLs by mouth every 4 hours as needed (anxiety) for up to 14 days. morphine 20 MG/5ML solution  Take 0.63 mLs by mouth every 2 hours as needed for Pain for up to 3 days.         CONTINUE taking these medications    tamsulosin 0.4 MG capsule  Commonly known as: FLOMAX  NEEDS APPOINTMENT - TAKE ONE TABLET BY MOUTH DAILY        STOP taking these medications    lisinopril 5 MG tablet  Commonly known as: PRINIVIL;ZESTRIL     MULTIVITAMIN PO           Where to Get Your Medications      You can get these medications from any pharmacy    Bring a paper prescription for each of these medications  · LORazepam 2 MG/ML concentrated solution  · morphine 20 MG/5ML solution       Activity: activity as tolerated  Diet: regular diet  Wound Care: none needed    Time Spent on discharge is more than 45 minutes    Signed:  Nghia Cox DO, PGY-1  1/5/2022     Addendum to Resident H& P/Progress note:  I have personally seen,examined and evaluated the patient.  I have reviewed the current history, physical findings, labs and assessment and plan and agree with note as documented by resident MD ( Vainey Kirby)      Steven Vargas MD, [facility]

## 2022-01-04 NOTE — CARE COORDINATION
CM made a referral to [address] [place] [address] per family request.  CM faxed order to [phone], spoke with intake at [facility]. They will reach out to family. 1:43 PM  CM spoke with Eliceo from [facility], who will be meeting with family at 0 today. 4:44 PM  CM received a voicemail from Nilson from [facility], they plan to take pt to inpatient unit at [facility]. An RN from [facility] will be here tomorrow to facilitate discharge plan.         Ophelia Wolfe, RN, BSN,   4th Floor Progressive Care Unit  [phone]

## 2022-01-04 NOTE — PROGRESS NOTES
Palliative Medicine Progress Note    Admit Date: 12/30/2021  Hospital Day:  Hospital Day: 6     CC: Generalized Weakness  HPI: Mark Burton is a 80 y.o. male with PMH of HTN and BPH who ws brought into the ED via his wife for ongoing concerns about his declining mental status. Pt reportedly began having memory difficulties a month ago as well as episodes of increased somnolence. When evaluated in the emergency department the patient could only answer limited questions and was noted to be disoriented. The patient was empirically treated for a UTI for hopes of improving his mentation along with a work-up for reversible causes of encephalopathy. Despite these treatments, his mentation has not yet improved nor have we located an causative etiology for his AMS. Saw pt at the bedside this morning. He remains drowsy and extremely dysarthic, with minimal response to questioning. He appears cachectic on exam.     Recommendations:     I had a discussion with both Emory Montoya and Melissa Barraza this morning to confirm their wishes for Mr. Fatuma Campuzano ongoing care. Per our discussion, the family had a lengthy discussion including multiple children and Todd Jeff (the pt's wife) and they collectively would like to proceed with Hospice care at this time. They would prefer a referral to be placed to [address] for their inpatient [facility] unit. This referral was placed on their behalf and I informed them that [address] would be in-contact as soon as possible for next steps and further goals of care discussions pending their current case load. 1. Goals of Care/Advanced Care planning/Code status: Todd Jeff (Pt's wife) is the primary decision maker and there is an alternate decision maker Emory Raynapaulino (the patient's daughter). Pateint is Limited x3 (No to CPR, Defibrillation, and Intubation). Pt's family in agreement with hospice today.    2. Pain: Patient has endorsed some pain in his legs from movement which may be a chronic issue, however he does not appear to be in discomfort at baseline. 3. SOB: No evidence of SOB or breathing difficulties on clinical exam. Sating well on room air. 4. Malnutrition: family reports pt's usual weight is 130#, and that he has always been thin. Per chart, pt weighed 125# in 2/2020. He has lost about 9# in the past 2 years, current BMI is 15.8. Pt is NPO per SLP recommendations. NG placement failed. Family does not want PEG tube placement at this point, d/w SLP. 5. Disposition: Family is interested in hospice. Likely Hospice [facility]    Subjective:     Scheduled Meds:   [Held by provider] lisinopril  5 mg Oral Daily    [Held by provider] tamsulosin  0.4 mg Oral Daily    sodium chloride flush  5-40 mL IntraVENous 2 times per day    enoxaparin  40 mg SubCUTAneous Daily       Continuous Infusions:   dilTIAZem 7.5 mg/hr (01/04/22 0349)    sodium chloride 25 mL (01/03/22 2043)       PRN Meds:labetalol, sodium chloride flush, sodium chloride, ondansetron **OR** ondansetron, polyethylene glycol, acetaminophen **OR** acetaminophen    Review of Systems. Review of Systems - History obtained from unobtainable from patient due to mental status      Performance status 30      Objective:     Patient Vitals for the past 8 hrs:   BP Temp Temp src Pulse Resp SpO2   01/04/22 0741 (!) 141/89 98.7 °F (37.1 °C) Axillary 86 20 95 %   01/04/22 0345 131/88 99.2 °F (37.3 °C) Axillary 95 22 93 %     I/O last 3 completed shifts: In: 0   Out: 2350 [Urine:2350]  No intake/output data recorded. Physical Exam  Constitutional:       Appearance: He is ill-appearing. Cardiovascular:      Rate and Rhythm: Normal rate and regular rhythm. Heart sounds: Normal heart sounds. Pulmonary:      Breath sounds: Normal breath sounds. Abdominal:      General: Bowel sounds are normal.      Palpations: Abdomen is soft. Musculoskeletal:      Left lower leg: No edema. Skin:     General: Skin is warm and dry.    Neurological:      Mental Status: He is alert. He is disoriented. WBC/Hgb/Hct/Plts:  5.7/13.7/40.6/176 (01/04 0437)           Assessment:     Active Problems:    Acute metabolic encephalopathy    Urinary tract infection without hematuria    Essential hypertension    Moderate malnutrition (HCC)    Underweight    Rapid atrial fibrillation (HCC)    Hypomagnesemia    Dysarthria    Hypokalemia  Resolved Problems:    * No resolved hospital problems. *      Time spent with patient and/or family: 21  Time reviewing records: 5  Time communicating with providers: 5    A total of 30 minutes spent with the patient and family on unit greater than 50% face to face time in counseling regarding palliative care and goals of care for the patient.        [address] [facility]  Inpatient Palliative Care  [phone]

## 2022-01-04 NOTE — PLAN OF CARE
Problem: Falls - Risk of:  Goal: Will remain free from falls  Description: Will remain free from falls  1/4/2022 1831 by Marlo Leon RN  Outcome: Met This Shift  Note: Fall precautions maintained, pt turned and repositioned Q2  1/4/2022 0530 by Wes Jacques RN  Outcome: Met This Shift  Note: Extremely weak and lethargic. Fall precautions in place. No attempts to exit bed without assistance this shift. Goal: Absence of physical injury  Description: Absence of physical injury  Outcome: Met This Shift     Problem: Skin Integrity:  Goal: Will show no infection signs and symptoms  Description: Will show no infection signs and symptoms  1/4/2022 1831 by Marlo Leon RN  Outcome: Met This Shift  1/4/2022 0530 by Wes Jacques RN  Outcome: Met This Shift  Note: Bed rest. Q2 turn. No new signs and symptoms of infections or wounds. Goal: Absence of new skin breakdown  Description: Absence of new skin breakdown  Outcome: Met This Shift  Note: No s/s of skin breakdown, pt turned Q2 and turns self     Problem: Infection:  Goal: Will remain free from infection  Description: Will remain free from infection  Outcome: Met This Shift     Problem: Safety:  Goal: Free from accidental physical injury  Description: Free from accidental physical injury  Outcome: Met This Shift  Goal: Free from intentional harm  Description: Free from intentional harm  Outcome: Met This Shift     Problem: Daily Care:  Goal: Daily care needs are met  Description: Daily care needs are met  Outcome: Met This Shift     Problem: Injury - Risk of, Physical Injury:  Goal: Will remain free from falls  Description: Will remain free from falls  1/4/2022 1831 by Marlo Leon RN  Outcome: Met This Shift  Note: Fall precautions maintained, pt turned and repositioned Q2  1/4/2022 0530 by Wes Jacques RN  Outcome: Met This Shift  Note: Extremely weak and lethargic. Fall precautions in place.  No attempts to exit bed without assistance this shift.   Goal: Absence of physical injury  Description: Absence of physical injury  Outcome: Met This Shift     Problem: Mood - Altered:  Goal: Mood stable  Description: Mood stable  Outcome: Met This Shift  Note: Pt pleasantly confused     Problem: Pain:  Goal: Patient's pain/discomfort is manageable  Description: Patient's pain/discomfort is manageable  1/4/2022 1831 by Rosalba Luz RN  Outcome: Ongoing  Note: No s/s of pain, pt denies  1/4/2022 0530 by Kirill Muse RN  Outcome: Met This Shift  Note: No complaints of pain this shift. Problem: Skin Integrity:  Goal: Skin integrity will stabilize  Description: Skin integrity will stabilize  Outcome: Ongoing     Problem: Discharge Planning:  Goal: Patients continuum of care needs are met  Description: Patients continuum of care needs are met  Outcome: Ongoing     Problem: Airway Clearance - Ineffective  Goal: Achieve or maintain patent airway  1/4/2022 1831 by Rosalba Luz RN  Outcome: Ongoing  Note: Pt denies SOB, O2 sats remain 95% or better  1/4/2022 0530 by Kirill Muse RN  Outcome: Met This Shift  Note: Patient has remained on RA this shift, sitting in low to mid 90's o2     Problem: Gas Exchange - Impaired  Goal: Absence of hypoxia  Outcome: Ongoing  Goal: Promote optimal lung function  Outcome: Ongoing     Problem: Breathing Pattern - Ineffective  Goal: Ability to achieve and maintain a regular respiratory rate  Outcome: Ongoing     Problem: Body Temperature -  Risk of, Imbalanced  Goal: Ability to maintain a body temperature within defined limits  Outcome: Ongoing  Goal: Will regain or maintain usual level of consciousness  Outcome: Ongoing  Note: Pt seemed more alert at end of shift.  A&O to self and family names  Goal: Complications related to the disease process, condition or treatment will be avoided or minimized  Outcome: Ongoing     Problem: Isolation Precautions - Risk of Spread of Infection  Goal: Prevent transmission of infection  Outcome: Ongoing     Problem: Nutrition Deficits  Goal: Optimize nutritional status  Outcome: Ongoing  Note: Pt remains NPO     Problem: Risk for Fluid Volume Deficit  Goal: Maintain normal heart rhythm  Outcome: Ongoing  Goal: Maintain absence of muscle cramping  Outcome: Ongoing  Goal: Maintain normal serum potassium, sodium, calcium, phosphorus, and pH  Outcome: Ongoing     Problem: Loneliness or Risk for Loneliness  Goal: Demonstrate positive use of time alone when socialization is not possible  Outcome: Ongoing     Problem: Fatigue  Goal: Verbalize increase energy and improved vitality  Outcome: Ongoing     Problem: Patient Education: Go to Patient Education Activity  Goal: Patient/Family Education  Outcome: Ongoing  Note: Pt family signed consents for Hospice [facility]     Problem: Mental Status - Impaired:  Goal: Mental status will improve  Description: Mental status will improve  Outcome: Ongoing     Problem: Nutrition  Goal: Optimal nutrition therapy  Outcome: Ongoing  Note: Pt is NPO     Problem: Cardiac:  Goal: Ability to maintain an adequate cardiac output will improve  Description: Ability to maintain an adequate cardiac output will improve  Outcome: Ongoing  Goal: Hemodynamic stability will improve  Description: Hemodynamic stability will improve  Outcome: Ongoing     Problem: Fluid Volume:  Goal: Ability to achieve and maintain adequate urine output will improve  Description: Ability to achieve and maintain adequate urine output will improve  Outcome: Ongoing     Problem: Respiratory:  Goal: Respiratory status will improve  Description: Respiratory status will improve  Outcome: Ongoing     Problem: Discharge Planning:  Goal: Ability to perform activities of daily living will improve  Description: Ability to perform activities of daily living will improve  Outcome: Ongoing  Goal: Participates in care planning  Description: Participates in care planning  Outcome: Ongoing     Problem: Mood - Altered:  Goal: Absence of abusive behavior  Description: Absence of abusive behavior  Outcome: Ongoing  Goal: Verbalizations of feeling emotionally comfortable while being cared for will increase  Description: Verbalizations of feeling emotionally comfortable while being cared for will increase  Outcome: Ongoing     Problem: Psychomotor Activity - Altered:  Goal: Absence of psychomotor disturbance signs and symptoms  Description: Absence of psychomotor disturbance signs and symptoms  Outcome: Ongoing     Problem: Sensory Perception - Impaired:  Goal: Demonstrations of improved sensory functioning will increase  Description: Demonstrations of improved sensory functioning will increase  Outcome: Ongoing  Goal: Decrease in sensory misperception frequency  Description: Decrease in sensory misperception frequency  Outcome: Ongoing  Goal: Able to refrain from responding to false sensory perceptions  Description: Able to refrain from responding to false sensory perceptions  Outcome: Ongoing  Goal: Demonstrates accurate environmental perceptions  Description: Demonstrates accurate environmental perceptions  Outcome: Ongoing  Goal: Able to distinguish between reality-based and nonreality-based thinking  Description: Able to distinguish between reality-based and nonreality-based thinking  Outcome: Ongoing     Problem: Sleep Pattern Disturbance:  Goal: Appears well-rested  Description: Appears well-rested  Outcome: Ongoing

## 2022-01-05 VITALS
WEIGHT: 116.18 LBS | TEMPERATURE: 97.9 F | RESPIRATION RATE: 16 BRPM | HEIGHT: 72 IN | BODY MASS INDEX: 15.74 KG/M2 | DIASTOLIC BLOOD PRESSURE: 70 MMHG | SYSTOLIC BLOOD PRESSURE: 115 MMHG | HEART RATE: 79 BPM | OXYGEN SATURATION: 94 %

## 2022-01-05 LAB
ALBUMIN SERPL-MCNC: 3.2 G/DL (ref 3.4–5)
ANION GAP SERPL CALCULATED.3IONS-SCNC: 10 MMOL/L (ref 3–16)
BUN BLDV-MCNC: 16 MG/DL (ref 7–20)
CALCIUM SERPL-MCNC: 8 MG/DL (ref 8.3–10.6)
CHLORIDE BLD-SCNC: 97 MMOL/L (ref 99–110)
CO2: 28 MMOL/L (ref 21–32)
CREAT SERPL-MCNC: <0.5 MG/DL (ref 0.8–1.3)
GFR AFRICAN AMERICAN: >60
GFR NON-AFRICAN AMERICAN: >60
GLUCOSE BLD-MCNC: 89 MG/DL (ref 70–99)
HCT VFR BLD CALC: 41.6 % (ref 40.5–52.5)
HEMOGLOBIN: 13.9 G/DL (ref 13.5–17.5)
MAGNESIUM: 1.9 MG/DL (ref 1.8–2.4)
MCH RBC QN AUTO: 29.9 PG (ref 26–34)
MCHC RBC AUTO-ENTMCNC: 33.4 G/DL (ref 31–36)
MCV RBC AUTO: 89.8 FL (ref 80–100)
PDW BLD-RTO: 13.1 % (ref 12.4–15.4)
PHOSPHORUS: 2.7 MG/DL (ref 2.5–4.9)
PLATELET # BLD: 184 K/UL (ref 135–450)
PMV BLD AUTO: 8.6 FL (ref 5–10.5)
POTASSIUM SERPL-SCNC: 3 MMOL/L (ref 3.5–5.1)
RBC # BLD: 4.63 M/UL (ref 4.2–5.9)
SODIUM BLD-SCNC: 135 MMOL/L (ref 136–145)
WBC # BLD: 6.2 K/UL (ref 4–11)

## 2022-01-05 PROCEDURE — 80069 RENAL FUNCTION PANEL: CPT

## 2022-01-05 PROCEDURE — 83735 ASSAY OF MAGNESIUM: CPT

## 2022-01-05 PROCEDURE — 99239 HOSP IP/OBS DSCHRG MGMT >30: CPT | Performed by: HOSPITALIST

## 2022-01-05 PROCEDURE — 6360000002 HC RX W HCPCS: Performed by: STUDENT IN AN ORGANIZED HEALTH CARE EDUCATION/TRAINING PROGRAM

## 2022-01-05 PROCEDURE — 2580000003 HC RX 258: Performed by: STUDENT IN AN ORGANIZED HEALTH CARE EDUCATION/TRAINING PROGRAM

## 2022-01-05 PROCEDURE — 85027 COMPLETE CBC AUTOMATED: CPT

## 2022-01-05 PROCEDURE — 36415 COLL VENOUS BLD VENIPUNCTURE: CPT

## 2022-01-05 RX ORDER — POTASSIUM CHLORIDE 7.45 MG/ML
10 INJECTION INTRAVENOUS
Status: ACTIVE | OUTPATIENT
Start: 2022-01-05 | End: 2022-01-05

## 2022-01-05 RX ADMIN — DEXTROSE AND SODIUM CHLORIDE: 5; 450 INJECTION, SOLUTION INTRAVENOUS at 11:07

## 2022-01-05 RX ADMIN — ENOXAPARIN SODIUM 40 MG: 100 INJECTION SUBCUTANEOUS at 08:36

## 2022-01-05 ASSESSMENT — PAIN SCALES - PAIN ASSESSMENT IN ADVANCED DEMENTIA (PAINAD)
NEGVOCALIZATION: 0
NEGVOCALIZATION: 0
BREATHING: 0
FACIALEXPRESSION: 0
BREATHING: 0
FACIALEXPRESSION: 0
NEGVOCALIZATION: 0
TOTALSCORE: 0
BREATHING: 0
CONSOLABILITY: 0
FACIALEXPRESSION: 0
BODYLANGUAGE: 0
FACIALEXPRESSION: 0
CONSOLABILITY: 0
CONSOLABILITY: 0
TOTALSCORE: 0
CONSOLABILITY: 0
CONSOLABILITY: 0
FACIALEXPRESSION: 0
BODYLANGUAGE: 0
BREATHING: 0
CONSOLABILITY: 0
BREATHING: 0
BODYLANGUAGE: 0
CONSOLABILITY: 0
CONSOLABILITY: 0
NEGVOCALIZATION: 0
BREATHING: 0
FACIALEXPRESSION: 0
BODYLANGUAGE: 0
BREATHING: 0
NEGVOCALIZATION: 0
FACIALEXPRESSION: 0
BREATHING: 0
TOTALSCORE: 0
BREATHING: 0
TOTALSCORE: 0
NEGVOCALIZATION: 0
FACIALEXPRESSION: 0
FACIALEXPRESSION: 0
TOTALSCORE: 0
NEGVOCALIZATION: 0
BODYLANGUAGE: 0
NEGVOCALIZATION: 0
CONSOLABILITY: 0
NEGVOCALIZATION: 0
BODYLANGUAGE: 0
BODYLANGUAGE: 0
TOTALSCORE: 0
CONSOLABILITY: 0
TOTALSCORE: 0
TOTALSCORE: 0
NEGVOCALIZATION: 0
BODYLANGUAGE: 0
TOTALSCORE: 0
TOTALSCORE: 0
BODYLANGUAGE: 0
BODYLANGUAGE: 0
FACIALEXPRESSION: 0
BREATHING: 0

## 2022-01-05 ASSESSMENT — PAIN SCALES - GENERAL
PAINLEVEL_OUTOF10: 0

## 2022-01-05 NOTE — DISCHARGE INSTR - COC
Continuity of Care Form    Patient Name: Tamika Curtis   :  1934  MRN:  [id number]    Admit date:  2021  Discharge date:  2022    Code Status Order: Limited   Advance Directives:      Admitting Physician:  Solo Kamara MD  PCP: Solo Kamara MD    Discharging Nurse: Mehnaz Ramirez 23 Unit/Room#: 6568/3264-13  Discharging Unit Phone Number: ***    Emergency Contact:   Extended Emergency Contact Information  Primary Emergency Contact: Amanda Burton  Address: [address] [facility], [address] [place] Phone: [phone]  Relation: Spouse  Secondary Emergency Contact: Jovita Burton   [address] Phone: [phone]  Relation: Child    Past Surgical History:  History reviewed. No pertinent surgical history. Immunization History:   Immunization History   Administered Date(s) Administered    COVID-19, Samina Atkinson, Primary or Immunocompromised, PF, 100mcg/0.5mL 2021, 2021    Influenza Virus Vaccine 10/01/2014    Influenza, High Dose (Fluzone 65 yrs and older) 10/29/2016, 2017    Influenza, Quadv, adjuvanted, 65 yrs +, IM, PF (Fluad) 2021    Influenza, Triv, inactivated, subunit, adjuvanted, IM (Fluad 65 yrs and older) 2019    PPD Test 10/29/2016    Pneumococcal Conjugate 13-valent ([id number]) 2016    Pneumococcal Polysaccharide ([id number]) 2019    Tdap (Boostrix, Adacel) 2016    Zoster Live (Zostavax) 2013       Active Problems:  Patient Active Problem List   Diagnosis Code    Injury of tendon of long head of right biceps S46.101A    Dizziness R42    Rib pain on right side R07.81    Fall as cause of accidental injury in home as place of occurrence W19. XXXA, Y92.009    Weakness generalized R53.1    Upper respiratory tract infection J06.9    Sepsis ([address] A41.9    Sepsis due to pneumonia (HCC) J18.9, A41.9    Volume depletion E86.9    SHAMEKA (acute kidney injury) ([facility] [address] N17.9    Pneumonia of right lower lobe due to infectious organism T58.3    Acute metabolic encephalopathy R53.11    Urinary tract infection without hematuria N39.0    Essential hypertension I10    Moderate malnutrition (HCC) E44.0    Underweight R63.6    Rapid atrial fibrillation (HCC) I48.91    Hypomagnesemia E83.42    Dysarthria R47.1    Hypokalemia E87.6       Isolation/Infection:   Isolation            Droplet Plus          Patient Infection Status       Infection Onset Added Last Indicated Last Indicated By Review Planned Expiration Resolved Resolved By    COVID-19 12/30/21 12/31/21 12/30/21 COVID-19 01/07/22 01/13/22      Resolved    COVID-19 (Rule Out) 12/30/21 12/30/21 12/30/21 COVID-19 (Ordered)   12/31/21 Rule-Out Test Resulted            Nurse Assessment:  Last Vital Signs: /86   Pulse 87   Temp 98.3 °F (36.8 °C) (Axillary)   Resp 18   Ht 6' (1.829 m)   Wt 116 lb 2.9 oz (52.7 kg)   SpO2 94%   BMI 15.76 kg/m²     Last documented pain score (0-10 scale): Pain Level: 0  Last Weight:   Wt Readings from Last 1 Encounters:   12/31/21 116 lb 2.9 oz (52.7 kg)     Mental Status:  disoriented    IV Access:  - None    Nursing Mobility/ADLs:  Walking   Dependent  Transfer  Dependent  Bathing  Dependent  Dressing  Dependent  Toileting  Dependent  Feeding  Dependent  Med Admin  Dependent  Med Delivery    ***  - unsure. Pt currently NPO and no oral meds given this shift     Wound Care Documentation and Therapy:        Elimination:  Continence: Bowel: No  Bladder: No  Urinary Catheter: None   Colostomy/Ileostomy/Ileal Conduit: No       Date of Last BM: 1/5/2022 - per night shift RN    Intake/Output Summary (Last 24 hours) at 1/5/2022 0902  Last data filed at 1/5/2022 0007  Gross per 24 hour   Intake --   Output 975 ml   Net -975 ml     I/O last 3 completed shifts:  In: -   Out: 9209 [Urine:2405]    Safety Concerns:      At Risk for Falls    Impairments/Disabilities:      Vision    Nutrition Therapy:  Current Nutrition Therapy: - NPO    Routes of Feeding: Oral  Liquids: pt currently NPO  Daily Fluid Restriction: yes - amount NPO  Last Modified Barium Swallow with Video (Video Swallowing Test): not done    Treatments at the Time of Hospital Discharge:   Respiratory Treatments: ***  Oxygen Therapy:  is not on home oxygen therapy. Ventilator:    - No ventilator support    Rehab Therapies: ***  Weight Bearing Status/Restrictions: No weight bearing restirctions  Other Medical Equipment (for information only, NOT a DME order):  ***  Other Treatments: ***    Patient's personal belongings (please select all that are sent with patient):  Valentin    RN SIGNATURE:  Electronically signed by Usman Montes De Oca RN on 1/5/22 at 9:09 AM EST    CASE MANAGEMENT/SOCIAL WORK SECTION    Inpatient Status Date: ***    Readmission Risk Assessment Score:  Readmission Risk              Risk of Unplanned Readmission:  15           Discharging to Facility/ Agency   Name:   Address:  Phone:  Fax:    Dialysis Facility (if applicable)   Name:  Address:  Dialysis Schedule:  Phone:  Fax:    / signature: {Esignature:[id number]}    PHYSICIAN SECTION    Prognosis: {Prognosis:2106170747}    Condition at Discharge: 50Donna Sanchez Patient Condition:945409290}    Rehab Potential (if transferring to Rehab): {Prognosis:5793412747}    Recommended Labs or Other Treatments After Discharge: ***    Physician Certification: I certify the above information and transfer of Alexandra Sorenson  is necessary for the continuing treatment of the diagnosis listed and that he requires {Admit to Appropriate Level of Care:99895} for {GREATER/LESS:810179477} 30 days.      Update Admission H&P: {CHP DME Changes in UVSWB:796098757}    PHYSICIAN SIGNATURE:  {Esignature:872822338}

## 2022-01-05 NOTE — CARE COORDINATION
Case Management Assessment            Discharge Note                    Date / Time of Note: 1/5/2022 11:50 AM                  Discharge Note Completed by: Bubba Tellez RN    Patient Name: Mark Burton   YOB: 1934  Diagnosis: Encephalopathy [G93.40]  Urinary tract infection without hematuria, site unspecified [C12.4]  Acute metabolic encephalopathy [G07.43]   Date / Time: 12/30/2021  6:07 PM    Current PCP: Yusra Martínez MD  Clinic patient: No    Hospitalization in the last 30 days: No    Advance Directives:  Code Status: Limited  [place] DNR form completed and on chart: Yes    Financial:  Payor: MEDICARE / Plan: MEDICARE PART A AND B / Product Type: *No Product type* /      Pharmacy:    [address] [place] [address] [place] [address]  Phone: [phone] Fax: [phone]      Assistance purchasing medications?: Potential Assistance Purchasing Medications: No  Assistance provided by Case Management: None at this time    Does patient want to participate in local refill/ meds to beds program?:      Meds To Beds General Rules:  1. Can ONLY be done Monday- Friday between 8:30am-5pm  2. Prescription(s) must be in pharmacy by 3pm to be filled same day  3. Copy of patient's insurance/ prescription drug card and patient face sheet must be sent along with the prescription(s)  4. Cost of Rx cannot be added to hospital bill. If financial assistance is needed, please contact unit  or ;  or  CANNOT provide pharmacy voucher for patients co-pays  5.  Patients can then  the prescription on their way out of the hospital at discharge, or pharmacy can deliver to the bedside if staff is available. (payment due at time of pick-up or delivery - cash, check, or card accepted)     Able to afford home medications/ co-pay costs: Yes    ADLS:  Current PT AM-PAC Score: 7 /24  Current OT AM-PAC Score: 7 /24      DISCHARGE Disposition: Inpatient Hospice [place] [address]     LOC at discharge: Not Applicable  KAVYA Completed: Not Indicated    Notification completed in HENS/PAS?:  Not Applicable    IMM Completed:   Not Indicated    Transportation:  Transportation PLAN for discharge: EMS transportation   Mode of Transport: Ambulance stretcher - BLS  Reason for medical transport: Bed confined: Meets the following criteria 1) unable to get out of bed without assistance or ambulate, 2) unable to safely sit up in a wheelchair, 3) unable to maintain erect seating position in a chair for time needed for transport  Name of Transport Company: [employer]  Phone: [phone]  Time of Transport: 1500    Transport form completed: Yes            Hospice Services:  Location: Inpatient Unit  Agency: [facility]  Phone: [phone]    Consents signed: Yes        Additional CM Notes:   Patient is discharging to [facility], [address]. Patient scheduled for transport at 1500 via [employer] [facility]. The Plan for Transition of Care is related to the following treatment goals of Encephalopathy [G93.40]  Urinary tract infection without hematuria, site unspecified [Q63.5]  Acute metabolic encephalopathy [R21.09]    The Patient and/or patient representative Gene and his family were provided with a choice of provider and agrees with the discharge plan Yes    Freedom of choice list was provided with basic dialogue that supports the patient's individualized plan of care/goals and shares the quality data associated with the providers.  Yes    Care Transitions patient: Yes    Comfort Burkett RN  [facility]  Case Management Department  Ph: [phone]  Fax: [phone]

## 2022-01-05 NOTE — PROGRESS NOTES
Progress Note    Admit Date: 12/30/2021  Diet: Diet NPO Exceptions are: Sips of Water with Meds    CC: AMS    Interval history: NAOE. Mentation is worsening today. Continues to be slow to respond and unable to answer a majority of question. No other issues noted    Wants \"Opportunity for good time\" but could not elaborate    Medications:     Scheduled Meds:   potassium chloride  10 mEq IntraVENous Q1H    thiamine (VITAMIN B1) IVPB  100 mg IntraVENous Q24H    [Held by provider] lisinopril  5 mg Oral Daily    [Held by provider] tamsulosin  0.4 mg Oral Daily    sodium chloride flush  5-40 mL IntraVENous 2 times per day    enoxaparin  40 mg SubCUTAneous Daily     Continuous Infusions:   dextrose 5 % and 0.45 % NaCl 75 mL/hr at 01/04/22 1134    dilTIAZem 5 mg/hr (01/04/22 1735)    sodium chloride 25 mL (01/04/22 1515)     PRN Meds:labetalol, sodium chloride flush, sodium chloride, ondansetron **OR** ondansetron, polyethylene glycol, acetaminophen **OR** acetaminophen    Objective:   Vitals:   T-max:  Patient Vitals for the past 8 hrs:   BP Temp Temp src Pulse Resp SpO2   01/05/22 0832 139/86 98.3 °F (36.8 °C) Axillary 87 18 94 %   01/05/22 0338 121/85 98.6 °F (37 °C) Axillary 92 18 98 %       Intake/Output Summary (Last 24 hours) at 1/5/2022 0944  Last data filed at 1/5/2022 0007  Gross per 24 hour   Intake    Output 975 ml   Net -975 ml     Review of Systems   Unable to perform ROS: Mental status change       Physical Exam  Constitutional:       General: He is not in acute distress. Appearance: He is ill-appearing. He is not toxic-appearing. Comments: thin   HENT:      Head: Normocephalic. Right Ear: External ear normal.      Left Ear: External ear normal.      Nose: Nose normal.      Mouth/Throat:      Mouth: Mucous membranes are dry. Pharynx: Oropharynx is clear. No oropharyngeal exudate or posterior oropharyngeal erythema. Eyes:      General:         Right eye: No discharge. Left eye: No discharge. Extraocular Movements: Extraocular movements intact. Conjunctiva/sclera: Conjunctivae normal.   Cardiovascular:      Rate and Rhythm: Tachycardia present. Rhythm irregular. Pulses: Normal pulses. Heart sounds: No murmur heard. Pulmonary:      Effort: Pulmonary effort is normal. No respiratory distress. Breath sounds: No wheezing or rales. Abdominal:      General: There is no distension. Palpations: Abdomen is soft. Tenderness: There is no abdominal tenderness. There is no guarding. Musculoskeletal:         General: No swelling or tenderness. Normal range of motion. Cervical back: Normal range of motion. No rigidity. Skin:     General: Skin is warm. Coloration: Skin is not jaundiced or pale. Neurological:      General: No focal deficit present. Mental Status: He is alert. Comments: Disoriented  Speech is difficult to comprehend       LABS:    CBC:   Recent Labs     01/03/22 0435 01/04/22 0437 01/05/22 0441   WBC 7.2 5.7 6.2   HGB 14.0 13.7 13.9   HCT 41.1 40.6 41.6    176 184   MCV 90.9 90.4 89.8     Renal:    Recent Labs     01/03/22 0435 01/04/22 0437 01/05/22 0441    136 135*   K 3.1* 3.1* 3.0*   CL 99 98* 97*   CO2 28 29 28   BUN 16 15 16   CREATININE <0.5* <0.5* <0.5*   GLUCOSE 123* 88 89   CALCIUM 7.8* 8.1* 8.0*   MG 2.00 1.90 1.90   PHOS 2.2* 2.8 2.7   ANIONGAP 9 9 10     Hepatic:   Recent Labs     01/03/22 0435 01/04/22 0437 01/05/22 0441   LABALBU 3.2* 2.9* 3.2*     Troponin:   No results for input(s): TROPONINI in the last 72 hours. -----------------------------------------------------------------  RAD:   CT Head WO Contrast   Final Result      Cerebral atrophy. Evidence of significant diffuse chronic small vessel white matter disease bilaterally. No acute intracranial findings. XR CHEST PORTABLE   Final Result      No acute cardiopulmonary findings. Assessment/Plan:     Acute metabolic encephalopathy of unknown etiology  Resolved UTI  -Consider other etiology given AMS has been on going for a month. Will treat UTI first.   - Continue IV ceftriaxone 5 days (12/30-1/3)   - PT/OT for weakness  - Continue on D5 with half-normal saline.  - Plans for discharge to hospice today. Will provide comfort care medications    Paroxsymal AFIB with RVR  - Rate has been in the 100s  -Increased dilt drip to 10. Once able to take pills will convert to p.o.  - ECHO when out of isolation    COVID 19 infection (stable)  - does not show signs of pneumonia at this time  - does not meet criteria for steroids, immune suppressants  - if worsens start steroids and check inflammatory markers    Malnourishment  - significant dysphagia, NPO  - dietary following  - SLP  Continues to follow    Chronic issues:   HTN - Continue home lisinopril - held while NPO   BPH - Continue home tamsulosin - held while NPO     Code Status: limited  FEN: NPO 2/2 swallow issues. Hospice to eval for goals of care  PPX: lovenox  DISPO: Hospice care    Ge Hackett DO, PGY-1  01/05/22  9:44 AM    This patient has will be staffed and discussed with Claudia Jaquez MD.    Addendum to Resident H& P/Progress note:  I have personally seen,examined and evaluated the patient.  I have reviewed the current history, physical findings, labs and assessment and plan and agree with note as documented by resident MD ( Mellissa Shone)      Claudia Jaquez MD, [address]

## 2022-01-05 NOTE — PLAN OF CARE
Problem: Falls - Risk of:  Goal: Will remain free from falls  Description: Will remain free from falls  1/5/2022 0034 by Ana Sanchez RN  Outcome: Met This Shift  Note: Patient weak and limited movement. Fall precautions in place. Avasys in room. Went to room and reoriented when attempting to get up. Problem: Skin Integrity:  Goal: Will show no infection signs and symptoms  Description: Will show no infection signs and symptoms  1/5/2022 0034 by Ana Sanchez RN  Outcome: Met This Shift  Note: Limited movement. Q2 turning and frequent check and changes. No new signs or symptoms of infection or injury. Problem: Discharge Planning:  Goal: Patients continuum of care needs are met  Description: Patients continuum of care needs are met  1/5/2022 0034 by Ana Sanchez RN  Outcome: Met This Shift  Note: Patient to discharge hospice today.      Problem: Airway Clearance - Ineffective  Goal: Achieve or maintain patent airway  1/5/2022 0034 by Ana Sanchez RN  Outcome: Met This Shift

## 2022-01-17 ENCOUNTER — TELEPHONE (OUTPATIENT)
Dept: INTERNAL MEDICINE CLINIC | Age: 87
End: 2022-01-17